# Patient Record
Sex: FEMALE | Race: WHITE | Employment: UNEMPLOYED | URBAN - METROPOLITAN AREA
[De-identification: names, ages, dates, MRNs, and addresses within clinical notes are randomized per-mention and may not be internally consistent; named-entity substitution may affect disease eponyms.]

---

## 2020-12-01 ENCOUNTER — DOCUMENTATION ONLY (OUTPATIENT)
Dept: PULMONOLOGY | Age: 65
End: 2020-12-01

## 2020-12-01 NOTE — PROGRESS NOTES
Called pt to set up new sleep apt ref by Dr Dewayne Mcadams; pt did not want to schedule at this time. Will call back when she is ready to schedule.

## 2024-11-11 ENCOUNTER — APPOINTMENT (OUTPATIENT)
Facility: HOSPITAL | Age: 69
End: 2024-11-11
Payer: MEDICARE

## 2024-11-11 ENCOUNTER — HOSPITAL ENCOUNTER (INPATIENT)
Facility: HOSPITAL | Age: 69
LOS: 4 days | Discharge: NURSING, REHAB OR LONG-TERM CARE | End: 2024-11-15
Attending: STUDENT IN AN ORGANIZED HEALTH CARE EDUCATION/TRAINING PROGRAM | Admitting: INTERNAL MEDICINE
Payer: MEDICARE

## 2024-11-11 DIAGNOSIS — G93.41 METABOLIC ENCEPHALOPATHY: ICD-10-CM

## 2024-11-11 DIAGNOSIS — G43.809 OTHER MIGRAINE WITHOUT STATUS MIGRAINOSUS, NOT INTRACTABLE: ICD-10-CM

## 2024-11-11 DIAGNOSIS — N39.0 URINARY TRACT INFECTION WITHOUT HEMATURIA, SITE UNSPECIFIED: Primary | ICD-10-CM

## 2024-11-11 PROBLEM — G92.8 TOXIC METABOLIC ENCEPHALOPATHY: Status: ACTIVE | Noted: 2024-11-11

## 2024-11-11 LAB
AMORPH CRY URNS QL MICRO: ABNORMAL
ANION GAP SERPL CALC-SCNC: 10 MMOL/L (ref 3–18)
APPEARANCE UR: ABNORMAL
BACTERIA URNS QL MICRO: ABNORMAL /HPF
BASOPHILS # BLD: 0.3 K/UL (ref 0–0.1)
BASOPHILS NFR BLD: 1 % (ref 0–2)
BILIRUB UR QL: NEGATIVE
BUN SERPL-MCNC: 22 MG/DL (ref 7–18)
BUN/CREAT SERPL: 13 (ref 12–20)
CALCIUM SERPL-MCNC: 8.8 MG/DL (ref 8.5–10.1)
CHLORIDE SERPL-SCNC: 104 MMOL/L (ref 100–111)
CO2 SERPL-SCNC: 23 MMOL/L (ref 21–32)
COLOR UR: YELLOW
CREAT SERPL-MCNC: 1.76 MG/DL (ref 0.6–1.3)
DIFFERENTIAL METHOD BLD: ABNORMAL
EOSINOPHIL # BLD: 0 K/UL (ref 0–0.4)
EOSINOPHIL NFR BLD: 0 % (ref 0–5)
EPITH CASTS URNS QL MICRO: ABNORMAL /LPF (ref 0–5)
ERYTHROCYTE [DISTWIDTH] IN BLOOD BY AUTOMATED COUNT: 15.8 % (ref 11.6–14.5)
FLUAV RNA SPEC QL NAA+PROBE: NOT DETECTED
FLUBV RNA SPEC QL NAA+PROBE: NOT DETECTED
GLUCOSE SERPL-MCNC: 131 MG/DL (ref 74–99)
GLUCOSE UR STRIP.AUTO-MCNC: NEGATIVE MG/DL
HCT VFR BLD AUTO: 34.3 % (ref 35–45)
HGB BLD-MCNC: 10.9 G/DL (ref 12–16)
HGB UR QL STRIP: NEGATIVE
IMM GRANULOCYTES # BLD AUTO: 0 K/UL
IMM GRANULOCYTES NFR BLD AUTO: 0 %
KETONES UR QL STRIP.AUTO: NEGATIVE MG/DL
LACTATE BLD-SCNC: 1.52 MMOL/L (ref 0.4–2)
LEUKOCYTE ESTERASE UR QL STRIP.AUTO: ABNORMAL
LYMPHOCYTES # BLD: 1.4 K/UL (ref 0.9–3.6)
LYMPHOCYTES NFR BLD: 5 % (ref 21–52)
MCH RBC QN AUTO: 25.4 PG (ref 24–34)
MCHC RBC AUTO-ENTMCNC: 31.8 G/DL (ref 31–37)
MCV RBC AUTO: 80 FL (ref 78–100)
METAMYELOCYTES NFR BLD MANUAL: 2 %
MONOCYTES # BLD: 1.9 K/UL (ref 0.05–1.2)
MONOCYTES NFR BLD: 7 % (ref 3–10)
MUCOUS THREADS URNS QL MICRO: ABNORMAL /LPF
NEUTS BAND NFR BLD MANUAL: 2 % (ref 0–5)
NEUTS SEG # BLD: 23.3 K/UL (ref 1.8–8)
NEUTS SEG NFR BLD: 83 % (ref 40–73)
NITRITE UR QL STRIP.AUTO: POSITIVE
NRBC # BLD: 0 K/UL (ref 0–0.01)
NRBC BLD-RTO: 0 PER 100 WBC
PH UR STRIP: 7.5 (ref 5–8)
PLATELET # BLD AUTO: 232 K/UL (ref 135–420)
PLATELET COMMENT: ABNORMAL
PMV BLD AUTO: 9.3 FL (ref 9.2–11.8)
POTASSIUM SERPL-SCNC: 3.7 MMOL/L (ref 3.5–5.5)
PROT UR STRIP-MCNC: ABNORMAL MG/DL
RBC # BLD AUTO: 4.29 M/UL (ref 4.2–5.3)
RBC #/AREA URNS HPF: ABNORMAL /HPF (ref 0–5)
RBC MORPH BLD: ABNORMAL
RBC MORPH BLD: ABNORMAL
SARS-COV-2 RNA RESP QL NAA+PROBE: NOT DETECTED
SODIUM SERPL-SCNC: 137 MMOL/L (ref 136–145)
SOURCE: NORMAL
SP GR UR REFRACTOMETRY: 1.01 (ref 1–1.03)
UROBILINOGEN UR QL STRIP.AUTO: 0.2 EU/DL (ref 0.2–1)
WBC # BLD AUTO: 27.4 K/UL (ref 4.6–13.2)
WBC URNS QL MICRO: ABNORMAL /HPF (ref 0–5)

## 2024-11-11 PROCEDURE — 81001 URINALYSIS AUTO W/SCOPE: CPT

## 2024-11-11 PROCEDURE — 96366 THER/PROPH/DIAG IV INF ADDON: CPT

## 2024-11-11 PROCEDURE — 71045 X-RAY EXAM CHEST 1 VIEW: CPT

## 2024-11-11 PROCEDURE — 83605 ASSAY OF LACTIC ACID: CPT

## 2024-11-11 PROCEDURE — 51702 INSERT TEMP BLADDER CATH: CPT

## 2024-11-11 PROCEDURE — 87636 SARSCOV2 & INF A&B AMP PRB: CPT

## 2024-11-11 PROCEDURE — 6360000002 HC RX W HCPCS: Performed by: INTERNAL MEDICINE

## 2024-11-11 PROCEDURE — 87186 SC STD MICRODIL/AGAR DIL: CPT

## 2024-11-11 PROCEDURE — 87088 URINE BACTERIA CULTURE: CPT

## 2024-11-11 PROCEDURE — 6360000002 HC RX W HCPCS: Performed by: STUDENT IN AN ORGANIZED HEALTH CARE EDUCATION/TRAINING PROGRAM

## 2024-11-11 PROCEDURE — 70450 CT HEAD/BRAIN W/O DYE: CPT

## 2024-11-11 PROCEDURE — 85025 COMPLETE CBC W/AUTO DIFF WBC: CPT

## 2024-11-11 PROCEDURE — 96365 THER/PROPH/DIAG IV INF INIT: CPT

## 2024-11-11 PROCEDURE — 99285 EMERGENCY DEPT VISIT HI MDM: CPT

## 2024-11-11 PROCEDURE — 1100000000 HC RM PRIVATE

## 2024-11-11 PROCEDURE — 87086 URINE CULTURE/COLONY COUNT: CPT

## 2024-11-11 PROCEDURE — 96375 TX/PRO/DX INJ NEW DRUG ADDON: CPT

## 2024-11-11 PROCEDURE — 2580000003 HC RX 258: Performed by: STUDENT IN AN ORGANIZED HEALTH CARE EDUCATION/TRAINING PROGRAM

## 2024-11-11 PROCEDURE — 80048 BASIC METABOLIC PNL TOTAL CA: CPT

## 2024-11-11 RX ORDER — CEPHALEXIN 500 MG/1
500 CAPSULE ORAL 2 TIMES DAILY
Qty: 14 CAPSULE | Refills: 0 | Status: SHIPPED | OUTPATIENT
Start: 2024-11-11 | End: 2024-11-15 | Stop reason: HOSPADM

## 2024-11-11 RX ORDER — METOCLOPRAMIDE HYDROCHLORIDE 5 MG/ML
10 INJECTION INTRAMUSCULAR; INTRAVENOUS
Status: COMPLETED | OUTPATIENT
Start: 2024-11-11 | End: 2024-11-11

## 2024-11-11 RX ORDER — 0.9 % SODIUM CHLORIDE 0.9 %
500 INTRAVENOUS SOLUTION INTRAVENOUS ONCE
Status: COMPLETED | OUTPATIENT
Start: 2024-11-11 | End: 2024-11-11

## 2024-11-11 RX ORDER — KETOROLAC TROMETHAMINE 15 MG/ML
15 INJECTION, SOLUTION INTRAMUSCULAR; INTRAVENOUS ONCE
Status: COMPLETED | OUTPATIENT
Start: 2024-11-11 | End: 2024-11-11

## 2024-11-11 RX ORDER — LEVOFLOXACIN 5 MG/ML
750 INJECTION, SOLUTION INTRAVENOUS
Status: DISCONTINUED | OUTPATIENT
Start: 2024-11-11 | End: 2024-11-14

## 2024-11-11 RX ORDER — DEXAMETHASONE SODIUM PHOSPHATE 10 MG/ML
10 INJECTION, SOLUTION INTRAMUSCULAR; INTRAVENOUS
Status: COMPLETED | OUTPATIENT
Start: 2024-11-11 | End: 2024-11-11

## 2024-11-11 RX ORDER — MAGNESIUM SULFATE IN WATER 40 MG/ML
2000 INJECTION, SOLUTION INTRAVENOUS
Status: COMPLETED | OUTPATIENT
Start: 2024-11-11 | End: 2024-11-11

## 2024-11-11 RX ORDER — DIPHENHYDRAMINE HYDROCHLORIDE 50 MG/ML
25 INJECTION INTRAMUSCULAR; INTRAVENOUS
Status: COMPLETED | OUTPATIENT
Start: 2024-11-11 | End: 2024-11-11

## 2024-11-11 RX ORDER — 0.9 % SODIUM CHLORIDE 0.9 %
1000 INTRAVENOUS SOLUTION INTRAVENOUS ONCE
Status: COMPLETED | OUTPATIENT
Start: 2024-11-11 | End: 2024-11-11

## 2024-11-11 RX ADMIN — CEFTRIAXONE 1000 MG: 1 INJECTION, POWDER, FOR SOLUTION INTRAMUSCULAR; INTRAVENOUS at 20:04

## 2024-11-11 RX ADMIN — LEVOFLOXACIN 750 MG: 5 INJECTION, SOLUTION INTRAVENOUS at 23:52

## 2024-11-11 RX ADMIN — DIPHENHYDRAMINE HYDROCHLORIDE 25 MG: 50 INJECTION INTRAMUSCULAR; INTRAVENOUS at 17:06

## 2024-11-11 RX ADMIN — METOCLOPRAMIDE HYDROCHLORIDE 10 MG: 5 INJECTION, SOLUTION INTRAMUSCULAR; INTRAVENOUS at 17:06

## 2024-11-11 RX ADMIN — SODIUM CHLORIDE 500 ML: 9 INJECTION, SOLUTION INTRAVENOUS at 17:38

## 2024-11-11 RX ADMIN — KETOROLAC TROMETHAMINE 15 MG: 15 INJECTION, SOLUTION INTRAMUSCULAR; INTRAVENOUS at 18:54

## 2024-11-11 RX ADMIN — MAGNESIUM SULFATE HEPTAHYDRATE 2000 MG: 40 INJECTION, SOLUTION INTRAVENOUS at 17:12

## 2024-11-11 RX ADMIN — DEXAMETHASONE SODIUM PHOSPHATE 10 MG: 10 INJECTION, SOLUTION INTRAMUSCULAR; INTRAVENOUS at 17:15

## 2024-11-11 RX ADMIN — SODIUM CHLORIDE 1000 ML: 9 INJECTION, SOLUTION INTRAVENOUS at 20:03

## 2024-11-11 ASSESSMENT — PAIN SCALES - GENERAL
PAINLEVEL_OUTOF10: 10
PAINLEVEL_OUTOF10: 8

## 2024-11-11 ASSESSMENT — PAIN DESCRIPTION - DESCRIPTORS: DESCRIPTORS: ACHING;THROBBING

## 2024-11-11 ASSESSMENT — ENCOUNTER SYMPTOMS
EYE DISCHARGE: 0
PHOTOPHOBIA: 1
DIARRHEA: 0
SHORTNESS OF BREATH: 0
VOMITING: 0
EYE REDNESS: 0
ABDOMINAL PAIN: 0
CHEST TIGHTNESS: 0
NAUSEA: 1
EYE ITCHING: 0
EYE PAIN: 0

## 2024-11-11 ASSESSMENT — PAIN - FUNCTIONAL ASSESSMENT: PAIN_FUNCTIONAL_ASSESSMENT: 0-10

## 2024-11-11 ASSESSMENT — PAIN DESCRIPTION - LOCATION: LOCATION: HEAD

## 2024-11-11 NOTE — ED TRIAGE NOTES
Pt reports migraine headache for 4 days;  took aspirin and caffeine on her own;  not helping  +photophobia  some nausea, no vomiting  Denies fever;   at [facility] pt given fioricet without relief; pt reports she feels sleepy and speech slow but that happens with the medication  Pt with hx of MS, has urin cath with leg bag;

## 2024-11-11 NOTE — ED PROVIDER NOTES
EMERGENCY DEPARTMENT HISTORY AND PHYSICAL EXAM      Date: 11/11/2024  Patient Name: Holly Garcia    History of Presenting Illness     Chief Complaint   Patient presents with    Headache       Patient is a 69-year-old female with history of multiple sclerosis presenting to the emergency department for evaluation of migraine headache.  States has been ongoing for the past 4 days.  Patient states it feels like her typical migraine headaches.  Describing left-sided frontal pounding pain associated with sensitivity to light and sound.  Reports nausea but no emesis.  Took aspirin and caffeine on her own without relief.  The facility staff gave her Fioricet but did not get any relief.  Denies any trauma.    Spoke with patient's son for additional history. States that she is more confused than normal and that last time that she presented this way she was diagnosed with a urinary tract infection.           PCP: Cristina Enrique APRN - NP    No current facility-administered medications for this encounter.     Current Outpatient Medications   Medication Sig Dispense Refill    cephALEXin (KEFLEX) 500 MG capsule Take 1 capsule by mouth 2 times daily for 7 days 14 capsule 0    albuterol sulfate HFA (PROVENTIL;VENTOLIN;PROAIR) 108 (90 Base) MCG/ACT inhaler Inhale 1 puff into the lungs      azelastine (ASTELIN) 0.1 % nasal spray       calcium carbonate 1500 (600 Ca) MG TABS tablet calcium carbonate 600 mg calcium (1,500 mg) tablet   TAKE ONE TABLET BY MOUTH TWICE DAILY      Calcium Carbonate Antacid 1000 MG CHEW Take 2,000 mg by mouth daily      vitamin D3 (CHOLECALCIFEROL) 10 MCG (400 UNIT) TABS tablet Vitamin D3 10 mcg (400 unit) tablet (Patient not taking: Reported on 6/21/2023)      melatonin 10 MG CAPS capsule Take 1 capsule by mouth      nystatin-triamcinolone (MYCOLOG II) 311377-4.1 UNIT/GM-% cream nystatin-triamcinolone 100,000 unit/g-0.1 % topical cream      polyethylene glycol (GLYCOLAX) 17 GM/SCOOP powder Take 17 g by

## 2024-11-12 ENCOUNTER — ANESTHESIA EVENT (OUTPATIENT)
Facility: HOSPITAL | Age: 69
End: 2024-11-12
Payer: MEDICARE

## 2024-11-12 ENCOUNTER — APPOINTMENT (OUTPATIENT)
Facility: HOSPITAL | Age: 69
End: 2024-11-12
Payer: MEDICARE

## 2024-11-12 ENCOUNTER — ANESTHESIA (OUTPATIENT)
Facility: HOSPITAL | Age: 69
End: 2024-11-12
Payer: MEDICARE

## 2024-11-12 PROBLEM — N39.0 URINARY TRACT INFECTION ASSOCIATED WITH CATHETERIZATION OF URINARY TRACT, INITIAL ENCOUNTER (HCC): Status: ACTIVE | Noted: 2024-11-12

## 2024-11-12 PROBLEM — A41.9 SEPSIS (HCC): Status: ACTIVE | Noted: 2024-11-12

## 2024-11-12 PROBLEM — N17.9 AKI (ACUTE KIDNEY INJURY) (HCC): Status: ACTIVE | Noted: 2024-11-12

## 2024-11-12 PROBLEM — T83.511A URINARY TRACT INFECTION ASSOCIATED WITH CATHETERIZATION OF URINARY TRACT, INITIAL ENCOUNTER (HCC): Status: ACTIVE | Noted: 2024-11-12

## 2024-11-12 PROCEDURE — 6360000002 HC RX W HCPCS: Performed by: INTERNAL MEDICINE

## 2024-11-12 PROCEDURE — 2580000003 HC RX 258: Performed by: INTERNAL MEDICINE

## 2024-11-12 PROCEDURE — 2000000000 HC ICU R&B

## 2024-11-12 PROCEDURE — 6360000002 HC RX W HCPCS: Performed by: NURSE ANESTHETIST, CERTIFIED REGISTERED

## 2024-11-12 PROCEDURE — 6360000004 HC RX CONTRAST MEDICATION: Performed by: UROLOGY

## 2024-11-12 PROCEDURE — 80048 BASIC METABOLIC PNL TOTAL CA: CPT

## 2024-11-12 PROCEDURE — BT1D1ZZ FLUOROSCOPY OF RIGHT KIDNEY, URETER AND BLADDER USING LOW OSMOLAR CONTRAST: ICD-10-PCS | Performed by: UROLOGY

## 2024-11-12 PROCEDURE — 0T768DZ DILATION OF RIGHT URETER WITH INTRALUMINAL DEVICE, VIA NATURAL OR ARTIFICIAL OPENING ENDOSCOPIC: ICD-10-PCS | Performed by: UROLOGY

## 2024-11-12 PROCEDURE — 2500000003 HC RX 250 WO HCPCS: Performed by: NURSE ANESTHETIST, CERTIFIED REGISTERED

## 2024-11-12 PROCEDURE — 6370000000 HC RX 637 (ALT 250 FOR IP): Performed by: INTERNAL MEDICINE

## 2024-11-12 PROCEDURE — 3700000001 HC ADD 15 MINUTES (ANESTHESIA): Performed by: UROLOGY

## 2024-11-12 PROCEDURE — P9047 ALBUMIN (HUMAN), 25%, 50ML: HCPCS | Performed by: INTERNAL MEDICINE

## 2024-11-12 PROCEDURE — 74420 UROGRAPHY RTRGR +-KUB: CPT

## 2024-11-12 PROCEDURE — 97162 PT EVAL MOD COMPLEX 30 MIN: CPT

## 2024-11-12 PROCEDURE — 74176 CT ABD & PELVIS W/O CONTRAST: CPT

## 2024-11-12 PROCEDURE — 84100 ASSAY OF PHOSPHORUS: CPT

## 2024-11-12 PROCEDURE — 82330 ASSAY OF CALCIUM: CPT

## 2024-11-12 PROCEDURE — C1758 CATHETER, URETERAL: HCPCS | Performed by: UROLOGY

## 2024-11-12 PROCEDURE — 3700000000 HC ANESTHESIA ATTENDED CARE: Performed by: UROLOGY

## 2024-11-12 PROCEDURE — C1769 GUIDE WIRE: HCPCS | Performed by: UROLOGY

## 2024-11-12 PROCEDURE — 3600000002 HC SURGERY LEVEL 2 BASE: Performed by: UROLOGY

## 2024-11-12 PROCEDURE — 97530 THERAPEUTIC ACTIVITIES: CPT

## 2024-11-12 PROCEDURE — 2709999900 HC NON-CHARGEABLE SUPPLY: Performed by: UROLOGY

## 2024-11-12 PROCEDURE — 3600000012 HC SURGERY LEVEL 2 ADDTL 15MIN: Performed by: UROLOGY

## 2024-11-12 PROCEDURE — 7100000000 HC PACU RECOVERY - FIRST 15 MIN: Performed by: UROLOGY

## 2024-11-12 PROCEDURE — 7100000001 HC PACU RECOVERY - ADDTL 15 MIN: Performed by: UROLOGY

## 2024-11-12 PROCEDURE — C2617 STENT, NON-COR, TEM W/O DEL: HCPCS | Performed by: UROLOGY

## 2024-11-12 DEVICE — URETERAL STENT
Type: IMPLANTABLE DEVICE | Status: FUNCTIONAL
Brand: POLARIS™ ULTRA

## 2024-11-12 RX ORDER — POLYETHYLENE GLYCOL 3350 17 G/17G
17 POWDER, FOR SOLUTION ORAL DAILY PRN
Status: DISCONTINUED | OUTPATIENT
Start: 2024-11-12 | End: 2024-11-12

## 2024-11-12 RX ORDER — ALBUTEROL SULFATE 0.83 MG/ML
2.5 SOLUTION RESPIRATORY (INHALATION) EVERY 4 HOURS PRN
Status: DISCONTINUED | OUTPATIENT
Start: 2024-11-12 | End: 2024-11-15 | Stop reason: HOSPADM

## 2024-11-12 RX ORDER — IBUPROFEN 200 MG
1250 CAPSULE ORAL 2 TIMES DAILY
Status: DISCONTINUED | OUTPATIENT
Start: 2024-11-12 | End: 2024-11-12 | Stop reason: SDUPTHER

## 2024-11-12 RX ORDER — SODIUM CHLORIDE 0.9 % (FLUSH) 0.9 %
5-40 SYRINGE (ML) INJECTION PRN
Status: DISCONTINUED | OUTPATIENT
Start: 2024-11-12 | End: 2024-11-12 | Stop reason: HOSPADM

## 2024-11-12 RX ORDER — ONDANSETRON 2 MG/ML
INJECTION INTRAMUSCULAR; INTRAVENOUS
Status: DISCONTINUED | OUTPATIENT
Start: 2024-11-12 | End: 2024-11-12 | Stop reason: SDUPTHER

## 2024-11-12 RX ORDER — ONDANSETRON 4 MG/1
4 TABLET, ORALLY DISINTEGRATING ORAL EVERY 8 HOURS PRN
Status: DISCONTINUED | OUTPATIENT
Start: 2024-11-12 | End: 2024-11-15 | Stop reason: HOSPADM

## 2024-11-12 RX ORDER — DEXAMETHASONE SODIUM PHOSPHATE 4 MG/ML
INJECTION, SOLUTION INTRA-ARTICULAR; INTRALESIONAL; INTRAMUSCULAR; INTRAVENOUS; SOFT TISSUE
Status: DISCONTINUED | OUTPATIENT
Start: 2024-11-12 | End: 2024-11-12 | Stop reason: SDUPTHER

## 2024-11-12 RX ORDER — CALCIUM CARBONATE 500(1250)
1 TABLET ORAL 2 TIMES DAILY
Status: DISCONTINUED | OUTPATIENT
Start: 2024-11-12 | End: 2024-11-15 | Stop reason: HOSPADM

## 2024-11-12 RX ORDER — MECOBALAMIN 5000 MCG
10 TABLET,DISINTEGRATING ORAL NIGHTLY PRN
Status: DISCONTINUED | OUTPATIENT
Start: 2024-11-12 | End: 2024-11-15 | Stop reason: HOSPADM

## 2024-11-12 RX ORDER — DULOXETIN HYDROCHLORIDE 30 MG/1
60 CAPSULE, DELAYED RELEASE ORAL DAILY
Status: DISCONTINUED | OUTPATIENT
Start: 2024-11-12 | End: 2024-11-15 | Stop reason: HOSPADM

## 2024-11-12 RX ORDER — IPRATROPIUM BROMIDE AND ALBUTEROL SULFATE 2.5; .5 MG/3ML; MG/3ML
1 SOLUTION RESPIRATORY (INHALATION)
Status: DISCONTINUED | OUTPATIENT
Start: 2024-11-12 | End: 2024-11-12 | Stop reason: HOSPADM

## 2024-11-12 RX ORDER — GABAPENTIN 300 MG/1
300 CAPSULE ORAL 3 TIMES DAILY
Status: DISCONTINUED | OUTPATIENT
Start: 2024-11-12 | End: 2024-11-15 | Stop reason: HOSPADM

## 2024-11-12 RX ORDER — ACETAMINOPHEN 325 MG/1
650 TABLET ORAL DAILY PRN
Status: DISCONTINUED | OUTPATIENT
Start: 2024-11-12 | End: 2024-11-15 | Stop reason: HOSPADM

## 2024-11-12 RX ORDER — IOPAMIDOL 612 MG/ML
INJECTION, SOLUTION INTRATHECAL PRN
Status: DISCONTINUED | OUTPATIENT
Start: 2024-11-12 | End: 2024-11-12 | Stop reason: ALTCHOICE

## 2024-11-12 RX ORDER — PROPOFOL 10 MG/ML
INJECTION, EMULSION INTRAVENOUS
Status: DISCONTINUED | OUTPATIENT
Start: 2024-11-12 | End: 2024-11-12 | Stop reason: SDUPTHER

## 2024-11-12 RX ORDER — CLOPIDOGREL BISULFATE 75 MG/1
75 TABLET ORAL ONCE
Status: DISCONTINUED | OUTPATIENT
Start: 2024-11-12 | End: 2024-11-15 | Stop reason: HOSPADM

## 2024-11-12 RX ORDER — LABETALOL HYDROCHLORIDE 5 MG/ML
10 INJECTION, SOLUTION INTRAVENOUS
Status: DISCONTINUED | OUTPATIENT
Start: 2024-11-12 | End: 2024-11-12 | Stop reason: HOSPADM

## 2024-11-12 RX ORDER — DROPERIDOL 2.5 MG/ML
0.62 INJECTION, SOLUTION INTRAMUSCULAR; INTRAVENOUS
Status: DISCONTINUED | OUTPATIENT
Start: 2024-11-12 | End: 2024-11-12 | Stop reason: HOSPADM

## 2024-11-12 RX ORDER — SODIUM CHLORIDE 0.9 % (FLUSH) 0.9 %
5-40 SYRINGE (ML) INJECTION EVERY 12 HOURS SCHEDULED
Status: DISCONTINUED | OUTPATIENT
Start: 2024-11-12 | End: 2024-11-12 | Stop reason: HOSPADM

## 2024-11-12 RX ORDER — NALOXONE HYDROCHLORIDE 0.4 MG/ML
INJECTION, SOLUTION INTRAMUSCULAR; INTRAVENOUS; SUBCUTANEOUS PRN
Status: DISCONTINUED | OUTPATIENT
Start: 2024-11-12 | End: 2024-11-12 | Stop reason: HOSPADM

## 2024-11-12 RX ORDER — MESALAMINE 400 MG/1
800 CAPSULE, DELAYED RELEASE ORAL 3 TIMES DAILY
Status: DISCONTINUED | OUTPATIENT
Start: 2024-11-12 | End: 2024-11-15 | Stop reason: HOSPADM

## 2024-11-12 RX ORDER — BACLOFEN 10 MG/1
10 TABLET ORAL 3 TIMES DAILY
Status: DISCONTINUED | OUTPATIENT
Start: 2024-11-12 | End: 2024-11-15 | Stop reason: HOSPADM

## 2024-11-12 RX ORDER — CARBIDOPA AND LEVODOPA 25; 100 MG/1; MG/1
1 TABLET ORAL 2 TIMES DAILY
Status: DISCONTINUED | OUTPATIENT
Start: 2024-11-12 | End: 2024-11-15 | Stop reason: HOSPADM

## 2024-11-12 RX ORDER — LIDOCAINE HYDROCHLORIDE 20 MG/ML
INJECTION, SOLUTION EPIDURAL; INFILTRATION; INTRACAUDAL; PERINEURAL
Status: DISCONTINUED | OUTPATIENT
Start: 2024-11-12 | End: 2024-11-12 | Stop reason: SDUPTHER

## 2024-11-12 RX ORDER — SODIUM CHLORIDE 0.9 % (FLUSH) 0.9 %
5-40 SYRINGE (ML) INJECTION PRN
Status: DISCONTINUED | OUTPATIENT
Start: 2024-11-12 | End: 2024-11-15 | Stop reason: HOSPADM

## 2024-11-12 RX ORDER — ENOXAPARIN SODIUM 100 MG/ML
40 INJECTION SUBCUTANEOUS DAILY
Status: DISCONTINUED | OUTPATIENT
Start: 2024-11-12 | End: 2024-11-12

## 2024-11-12 RX ORDER — TRAZODONE HYDROCHLORIDE 50 MG/1
50 TABLET, FILM COATED ORAL NIGHTLY
Status: DISCONTINUED | OUTPATIENT
Start: 2024-11-12 | End: 2024-11-15 | Stop reason: HOSPADM

## 2024-11-12 RX ORDER — FLUTICASONE PROPIONATE 50 MCG
1 SPRAY, SUSPENSION (ML) NASAL 2 TIMES DAILY PRN
Status: DISCONTINUED | OUTPATIENT
Start: 2024-11-12 | End: 2024-11-12

## 2024-11-12 RX ORDER — SODIUM CHLORIDE 9 MG/ML
INJECTION, SOLUTION INTRAVENOUS PRN
Status: DISCONTINUED | OUTPATIENT
Start: 2024-11-12 | End: 2024-11-15 | Stop reason: HOSPADM

## 2024-11-12 RX ORDER — FUROSEMIDE 20 MG/1
10 TABLET ORAL DAILY
Status: DISCONTINUED | OUTPATIENT
Start: 2024-11-14 | End: 2024-11-15 | Stop reason: HOSPADM

## 2024-11-12 RX ORDER — FENTANYL CITRATE 50 UG/ML
INJECTION, SOLUTION INTRAMUSCULAR; INTRAVENOUS
Status: DISCONTINUED | OUTPATIENT
Start: 2024-11-12 | End: 2024-11-12 | Stop reason: SDUPTHER

## 2024-11-12 RX ORDER — ONDANSETRON 2 MG/ML
4 INJECTION INTRAMUSCULAR; INTRAVENOUS
Status: DISCONTINUED | OUTPATIENT
Start: 2024-11-12 | End: 2024-11-12 | Stop reason: HOSPADM

## 2024-11-12 RX ORDER — ALBUMIN (HUMAN) 12.5 G/50ML
25 SOLUTION INTRAVENOUS ONCE
Status: COMPLETED | OUTPATIENT
Start: 2024-11-12 | End: 2024-11-12

## 2024-11-12 RX ORDER — AZELASTINE 1 MG/ML
1 SPRAY, METERED NASAL 2 TIMES DAILY
Status: ACTIVE | OUTPATIENT
Start: 2024-11-12 | End: 2024-11-15

## 2024-11-12 RX ORDER — ONDANSETRON 2 MG/ML
4 INJECTION INTRAMUSCULAR; INTRAVENOUS EVERY 6 HOURS PRN
Status: DISCONTINUED | OUTPATIENT
Start: 2024-11-12 | End: 2024-11-15 | Stop reason: HOSPADM

## 2024-11-12 RX ORDER — DIPHENHYDRAMINE HYDROCHLORIDE 50 MG/ML
12.5 INJECTION INTRAMUSCULAR; INTRAVENOUS
Status: DISCONTINUED | OUTPATIENT
Start: 2024-11-12 | End: 2024-11-12 | Stop reason: HOSPADM

## 2024-11-12 RX ORDER — SODIUM CHLORIDE 9 MG/ML
INJECTION, SOLUTION INTRAVENOUS PRN
Status: DISCONTINUED | OUTPATIENT
Start: 2024-11-12 | End: 2024-11-12 | Stop reason: HOSPADM

## 2024-11-12 RX ORDER — ROPINIROLE 0.25 MG/1
0.25 TABLET, FILM COATED ORAL
Status: DISCONTINUED | OUTPATIENT
Start: 2024-11-12 | End: 2024-11-15 | Stop reason: HOSPADM

## 2024-11-12 RX ORDER — BUTALBITAL, ACETAMINOPHEN AND CAFFEINE 50; 325; 40 MG/1; MG/1; MG/1
1 TABLET ORAL EVERY 6 HOURS PRN
Status: ON HOLD | COMMUNITY
End: 2024-11-12 | Stop reason: SINTOL

## 2024-11-12 RX ORDER — ATORVASTATIN CALCIUM 20 MG/1
20 TABLET, FILM COATED ORAL NIGHTLY
Status: DISCONTINUED | OUTPATIENT
Start: 2024-11-12 | End: 2024-11-15 | Stop reason: HOSPADM

## 2024-11-12 RX ORDER — HEPARIN SODIUM 5000 [USP'U]/ML
5000 INJECTION, SOLUTION INTRAVENOUS; SUBCUTANEOUS EVERY 8 HOURS SCHEDULED
Status: DISCONTINUED | OUTPATIENT
Start: 2024-11-13 | End: 2024-11-15 | Stop reason: HOSPADM

## 2024-11-12 RX ORDER — SODIUM CHLORIDE 0.9 % (FLUSH) 0.9 %
5-40 SYRINGE (ML) INJECTION EVERY 12 HOURS SCHEDULED
Status: DISCONTINUED | OUTPATIENT
Start: 2024-11-12 | End: 2024-11-15 | Stop reason: HOSPADM

## 2024-11-12 RX ORDER — FUROSEMIDE 20 MG/1
10 TABLET ORAL DAILY
Status: ON HOLD | COMMUNITY
End: 2024-11-15 | Stop reason: HOSPADM

## 2024-11-12 RX ORDER — DOCUSATE SODIUM 100 MG/1
100 CAPSULE, LIQUID FILLED ORAL 2 TIMES DAILY
Status: DISCONTINUED | OUTPATIENT
Start: 2024-11-12 | End: 2024-11-15 | Stop reason: HOSPADM

## 2024-11-12 RX ORDER — HYDROMORPHONE HYDROCHLORIDE 1 MG/ML
0.5 INJECTION, SOLUTION INTRAMUSCULAR; INTRAVENOUS; SUBCUTANEOUS EVERY 5 MIN PRN
Status: DISCONTINUED | OUTPATIENT
Start: 2024-11-12 | End: 2024-11-12 | Stop reason: HOSPADM

## 2024-11-12 RX ORDER — ALBUTEROL SULFATE 90 UG/1
1 INHALANT RESPIRATORY (INHALATION) EVERY 4 HOURS PRN
Status: DISCONTINUED | OUTPATIENT
Start: 2024-11-12 | End: 2024-11-12 | Stop reason: ALTCHOICE

## 2024-11-12 RX ORDER — 0.9 % SODIUM CHLORIDE 0.9 %
500 INTRAVENOUS SOLUTION INTRAVENOUS ONCE
Status: COMPLETED | OUTPATIENT
Start: 2024-11-12 | End: 2024-11-12

## 2024-11-12 RX ORDER — SODIUM CHLORIDE, SODIUM LACTATE, POTASSIUM CHLORIDE, CALCIUM CHLORIDE 600; 310; 30; 20 MG/100ML; MG/100ML; MG/100ML; MG/100ML
INJECTION, SOLUTION INTRAVENOUS CONTINUOUS
Status: DISCONTINUED | OUTPATIENT
Start: 2024-11-12 | End: 2024-11-12 | Stop reason: HOSPADM

## 2024-11-12 RX ORDER — SODIUM CHLORIDE 9 MG/ML
INJECTION, SOLUTION INTRAVENOUS CONTINUOUS
Status: DISPENSED | OUTPATIENT
Start: 2024-11-12 | End: 2024-11-13

## 2024-11-12 RX ORDER — PHENYLEPHRINE HCL IN 0.9% NACL 1 MG/10 ML
SYRINGE (ML) INTRAVENOUS
Status: DISCONTINUED | OUTPATIENT
Start: 2024-11-12 | End: 2024-11-12 | Stop reason: SDUPTHER

## 2024-11-12 RX ORDER — FENTANYL CITRATE 50 UG/ML
25 INJECTION, SOLUTION INTRAMUSCULAR; INTRAVENOUS EVERY 5 MIN PRN
Status: DISCONTINUED | OUTPATIENT
Start: 2024-11-12 | End: 2024-11-12 | Stop reason: HOSPADM

## 2024-11-12 RX ORDER — FLUTICASONE PROPIONATE 50 MCG
1 SPRAY, SUSPENSION (ML) NASAL 2 TIMES DAILY
Status: DISCONTINUED | OUTPATIENT
Start: 2024-11-13 | End: 2024-11-15 | Stop reason: HOSPADM

## 2024-11-12 RX ADMIN — ENOXAPARIN SODIUM 40 MG: 100 INJECTION SUBCUTANEOUS at 10:22

## 2024-11-12 RX ADMIN — ALBUMIN (HUMAN) 25 G: 0.25 INJECTION, SOLUTION INTRAVENOUS at 20:37

## 2024-11-12 RX ADMIN — Medication 100 MCG: at 21:35

## 2024-11-12 RX ADMIN — SODIUM CHLORIDE, PRESERVATIVE FREE 10 ML: 5 INJECTION INTRAVENOUS at 10:26

## 2024-11-12 RX ADMIN — LIDOCAINE HYDROCHLORIDE 100 MG: 20 INJECTION, SOLUTION EPIDURAL; INFILTRATION; INTRACAUDAL; PERINEURAL at 21:25

## 2024-11-12 RX ADMIN — SODIUM CHLORIDE: 900 INJECTION, SOLUTION INTRAVENOUS at 21:45

## 2024-11-12 RX ADMIN — FENTANYL CITRATE 50 MCG: 50 INJECTION, SOLUTION INTRAMUSCULAR; INTRAVENOUS at 21:38

## 2024-11-12 RX ADMIN — DULOXETINE HYDROCHLORIDE 60 MG: 30 CAPSULE, DELAYED RELEASE ORAL at 10:17

## 2024-11-12 RX ADMIN — FENTANYL CITRATE 50 MCG: 50 INJECTION, SOLUTION INTRAMUSCULAR; INTRAVENOUS at 21:21

## 2024-11-12 RX ADMIN — SODIUM CHLORIDE, PRESERVATIVE FREE 10 ML: 5 INJECTION INTRAVENOUS at 22:54

## 2024-11-12 RX ADMIN — PIPERACILLIN AND TAZOBACTAM 3375 MG: 3; .375 INJECTION, POWDER, LYOPHILIZED, FOR SOLUTION INTRAVENOUS at 23:57

## 2024-11-12 RX ADMIN — DEXAMETHASONE SODIUM PHOSPHATE 4 MG: 4 INJECTION INTRA-ARTICULAR; INTRALESIONAL; INTRAMUSCULAR; INTRAVENOUS; SOFT TISSUE at 21:37

## 2024-11-12 RX ADMIN — Medication 100 MCG: at 21:46

## 2024-11-12 RX ADMIN — SODIUM CHLORIDE: 900 INJECTION, SOLUTION INTRAVENOUS at 13:14

## 2024-11-12 RX ADMIN — CARBIDOPA AND LEVODOPA 1 TABLET: 25; 100 TABLET ORAL at 10:16

## 2024-11-12 RX ADMIN — Medication 100 MCG: at 21:32

## 2024-11-12 RX ADMIN — ONDANSETRON 4 MG: 2 INJECTION INTRAMUSCULAR; INTRAVENOUS at 21:29

## 2024-11-12 RX ADMIN — PIPERACILLIN AND TAZOBACTAM 3375 MG: 3; .375 INJECTION, POWDER, LYOPHILIZED, FOR SOLUTION INTRAVENOUS at 13:14

## 2024-11-12 RX ADMIN — SODIUM CHLORIDE: 900 INJECTION, SOLUTION INTRAVENOUS at 22:12

## 2024-11-12 RX ADMIN — SODIUM CHLORIDE 500 ML: 9 INJECTION, SOLUTION INTRAVENOUS at 17:56

## 2024-11-12 RX ADMIN — PROPOFOL 150 MG: 10 INJECTION, EMULSION INTRAVENOUS at 21:25

## 2024-11-12 RX ADMIN — PIPERACILLIN AND TAZOBACTAM 3375 MG: 3; .375 INJECTION, POWDER, LYOPHILIZED, FOR SOLUTION INTRAVENOUS at 18:42

## 2024-11-12 RX ADMIN — Medication 100 MCG: at 21:38

## 2024-11-12 RX ADMIN — Medication 100 MCG: at 21:42

## 2024-11-12 ASSESSMENT — PAIN SCALES - GENERAL
PAINLEVEL_OUTOF10: 0
PAINLEVEL_OUTOF10: 0

## 2024-11-12 ASSESSMENT — PAIN SCALES - WONG BAKER: WONGBAKER_NUMERICALRESPONSE: NO HURT

## 2024-11-12 NOTE — PROGRESS NOTES
Physical Therapy Goals:  Initiated 11/12/2024 to be met within 7-10 days.  Short Term Goals  Short Term Goal 1: Patient will perform rolling R/L with independence  Short Term Goal 2: Patient will perform supine to/from sit with supervision  Short Term Goal 3: Patient will perform sit to/from stand with Anuj in prep for gait training  Short Term Goal 4: Patient will perform bed to/from chair transfer with Anuj to progress OOB mobility  PHYSICAL THERAPY EVALUATION    Patient: Holly Garcia (69 y.o. female)  Date: 11/12/2024  Diagnosis: Metabolic encephalopathy [G93.41]  UTI (urinary tract infection) [N39.0]  Other migraine without status migrainosus, not intractable [G43.809]  Urinary tract infection without hematuria, site unspecified [N39.0]  Toxic metabolic encephalopathy [G92.8]  Urinary tract infection associated with catheterization of urinary tract, initial encounter ([facility]) [T83.511A, N39.0] UTI (urinary tract infection)  Precautions: Fall Risk  PLOF: Assisted lateral transfers to/from wheelchair. Patient reports she does not ambulate. Lives in long-term.    ASSESSMENT :  Patient received supine in bed. Pt presents with decreased LE strength, decreased endurance, decreased activity tolerance, decreased bed mobility and transfers , impaired cognition, decreased command following, decreased trunk control, and impaired balance. Patient with a history of MS. Patient performed supine to sit with Anuj. Trunk control decreased requiring intermittent CGA to maintain upright sitting position. Patient with frequent extraneous movement, appears anxious. Difficult to understand at times due to word finding difficulties and expressive aphasia. Requires reorientation to task. Patient appears below baseline level and would benefit from continued skilled PT to progress functional mobility.     DEFICITS/IMPAIRMENTS:   Body Structures, Functions, Activity Limitations Requiring Skilled Therapeutic Intervention: Decreased functional  (retirement)  Type of Home: Assisted living  Home Layout: One level  Home Access: Level entry  Home Equipment: Wheelchair - Manual  ADL Assistance: Needs assistance  Bath: Maximum assistance  Dressing: Moderate assistance  Grooming: Moderate assistance  Feeding: Minimal assistance  Toileting: Needs assistance  Homemaking Assistance: Needs assistance  Meal Prep: Maximal  Laundry: Total  Vacuuming: Total  Cleaning: Total  Gardening: Total  Yard Work: Total  Driving: Total  Shopping: Total  Homemaking Responsibilities: No  Ambulation Assistance: Needs assistance (Pt uses wheelchair)  Transfer Assistance: Needs assistance  Occupation: Retired  Type of Occupation: Retired  Strength:    Strength: Generally decreased, functional  Tone & Sensation:   Tone: Normal  Sensation: Impaired  Range Of Motion:  AROM: Generally decreased, functional  PROM: Generally decreased, functional  Functional Mobility:  Bed Mobility:   Bed Mobility Training  Bed Mobility Training: Yes  Rolling: Minimum assistance  Supine to Sit: Minimum assistance  Sit to Supine: Minimum assistance  Balance:   Balance  Sitting: Impaired  Sitting - Static: Fair (occasional)  Sitting - Dynamic: Poor (constant support)  Pain:  Pain level pre-treatment: 10/10   Pain level post-treatment: 10/10   Pain Intervention(s): Medication (see MAR); Rest, Ice, Repositioning  Response to intervention: Nurse notified, See doc flow    Activity Tolerance:   Activity Tolerance: Patient tolerated evaluation without incident;Patient limited by endurance (anxiety)    After treatment:   []         Patient left in no apparent distress sitting up in chair  [x]         Patient left in no apparent distress in bed  [x]         Call bell left within reach  [x]         Nursing notified  []         Caregiver present  [x]         Bed alarm activated  []         SCDs applied    COMMUNICATION/EDUCATION:   Patient Education  Education Given To: Patient  Education Provided: Role of Therapy;Plan of

## 2024-11-12 NOTE — PROGRESS NOTES
TRANSFER - IN REPORT:    Verbal report received from Melita. RN on Holly Garcia  being received from ED for routine progression of patient care      Report consisted of patient's Situation, Background, Assessment and   Recommendations(SBAR).     Information from the following report(s) Nurse Handoff Report, Index, ED Encounter Summary, ED SBAR, Adult Overview, Intake/Output, MAR, and Recent Results was reviewed with the receiving nurse.    Opportunity for questions and clarification was provided.        2300  Care assumed upon patient's arrival to unit. Assessment done and documented in flowsheet, patient a & o x 1, restless and confused, rapp in place with leg bag, changed to a standard urine bag. Unable to complete some admission questionnaire due to confusion, minimal blood seen on gown and sheets, assessed to be from displaced IV line, no active bleeding noted, cleaned and changed, safety measures in place.

## 2024-11-12 NOTE — PLAN OF CARE
Problem: Pain  Goal: Verbalizes/displays adequate comfort level or baseline comfort level  Outcome: Progressing     Problem: Safety - Adult  Goal: Free from fall injury  Outcome: Progressing     Problem: Confusion  Goal: Confusion, delirium, dementia, or psychosis is improved or at baseline  Description: INTERVENTIONS:  1. Assess for possible contributors to thought disturbance, including medications, impaired vision or hearing, underlying metabolic abnormalities, dehydration, psychiatric diagnoses, and notify attending LIP  2. [place] high risk fall precautions, as indicated  3. Provide frequent short contacts to provide reality reorientation, refocusing and direction  4. Decrease environmental stimuli, including noise as appropriate  5. Monitor and intervene to maintain adequate nutrition, hydration, elimination, sleep and activity  6. If unable to ensure safety without constant attention obtain sitter and review sitter guidelines with assigned personnel  7. Initiate Psychosocial CNS and Spiritual Care consult, as indicated  Outcome: Progressing      Addended by: RONNIE STRICKLAND on: 3/5/2021 03:49 PM     Modules accepted: Orders

## 2024-11-12 NOTE — H&P
Coronal and sagittal reformats. CT dose reduction was achieved through use of a standardized protocol tailored for this examination and automatic exposure control for dose modulation.  FINDINGS: The ventricles and sulci are normal in size, shape and configuration. Significant small vessel ischemic changes are seen in the periventricular white matter. There is no intracranial hemorrhage, extra-axial collection, or mass effect. The basilar cisterns are open. No CT evidence of acute infarct. The bone windows demonstrate no abnormalities. The visualized portions of the paranasal sinuses and mastoid air cells are clear.     No acute abnormality. Electronically signed by CRISTÓBAL NOBLE    Imaging results impression onlyXR CHEST PORTABLE    Result Date: 11/11/2024  No acute intrathoracic process. Electronically signed by [employer]    CT HEAD WO CONTRAST    Result Date: 11/11/2024  No acute abnormality. Electronically signed by CRISTÓBAL NOBLE     XR CHEST PORTABLE   Final Result   No acute intrathoracic process.         Electronically signed by [employer]      CT HEAD WO CONTRAST   Final Result   No acute abnormality.            Electronically signed by CRISTÓBAL NOBLE        LAST EKG  No results found for this or any previous visit.    Procedures & Imaging: see electronic medical records for all procedures, imaging (including ultrasounds) and microbiology data as well as reviewing past records, which may have not been copied into this note, but were reviewed prior to development of assessment and plan.       Assessment/Plan     Principal Problem:    UTI (urinary tract infection)  Active Problems:    Neurogenic bladder    Restless leg    Toxic metabolic encephalopathy    Urinary tract infection associated with catheterization of urinary tract, initial encounter ([facility])  Resolved Problems:    * No resolved hospital problems. *       Admit to med surg w/ remote tele:    UTI, complicated/cath-associated  ED: started rocephin and  patient/family/caregiver  Care coordination and discharge planning with Case Management.        Dear patient, if you are reviewing this note and have a question regarding the medical terminology, please bring it with you to your next PCP visit.  Medical notes are meant to be a communication between medical professionals.  Additionally, portion of this note were created using voice recognition function; therefore, syntax and phonetic errors may have escaped proofreading.    Niall Dutta DO, PhD  [facility]    11/12/2024 9:01 AM

## 2024-11-12 NOTE — PROGRESS NOTES
reviewing separately obtained history  Performing a medically necessary appropriate examination and/or evaluation  Counseling and educating the patient/family/caregiver  Ordering medications, tests, or procedures  Referring and communicating with other health care professionals as needed  Documenting clinical information in the electronic or other health record  Independently interpreting results (not reported separately) and communicating results to the patient/family/caregiver  Care coordination and discharge planning with Case Management.      Dear patient, if you are reviewing this note and have a question regarding the medical terminology, please bring it with you to your next PCP visit.  Medical notes are meant to be a communication between medical professionals.  Additionally, portion of this note were created using voice recognition function, syntax and phonetic over may have escaped proofreading.    Shmuel Joel MD

## 2024-11-12 NOTE — CARE COORDINATION
This writer spoke with the pt at the bedside. The pt was admitted for a CAUTI. The pt requires a wheelchair for mobility and requires maximal assistance with ADL's due to MS diagnosis. The pt resides in [facility]. This writer contacted the facility who confirmed the pt is a resident of the Assisted Living portion of their facility. The pt will discharge back to [facility] when medically ready. Anticipated DC needs include medical transport, which this writer will set up when the pt is medically ready for discharge. No other needs identified. PCP confirmed. All questions answered.     11/12/24 2196   Service Assessment   Patient Orientation Alert and Oriented   Cognition Alert   History Provided By Patient   Primary Caregiver Private caregiver  (jail staff)   Support Systems Children;Other (Comment);Family Members  (jail staff)   Patient's Healthcare Decision Maker is: Legal Next of Kin   PCP Verified by CM Yes   Last Visit to PCP Within last 6 months   Prior Functional Level Assistance with the following:;Bathing;Dressing;Toileting;Mobility   Current Functional Level Assistance with the following:;Bathing;Dressing;Toileting;Mobility   Can patient return to prior living arrangement Yes   Ability to make needs known: Fair   Family able to assist with home care needs: Other (comment)  ([facility])   Would you like for me to discuss the discharge plan with any other family members/significant others, and if so, who? Yes  (This writer will call pt's son)   Financial Resources Medicare   Community Resources Assisted Living   CM/SW Referral Other (see comment)   Social/Functional History   Lives With Other (comment)  (jail staff)   Type of Home Assisted living   Home Equipment Wheelchair - Manual   ADL Assistance Needs assistance   Bath Maximum assistance   Dressing Moderate assistance   Grooming Moderate assistance   Feeding Minimal assistance   Toileting Needs assistance   Homemaking Assistance Needs

## 2024-11-12 NOTE — ED NOTES
Report called to floor RN Beatrice, no further questions/orders at this time. Will set up bed watch and have patient taken up since she does not have telemetry orders.

## 2024-11-12 NOTE — PROGRESS NOTES
4 Eyes Skin Assessment     NAME:  Holly Garcia  YOB: 1955  MEDICAL RECORD NUMBER:  [id number]    The patient is being assessed for  Admission    I agree that at least one RN has performed a thorough Head to Toe Skin Assessment on the patient. ALL assessment sites listed below have been assessed.      Areas assessed by both nurses:    Head, Face, Ears, Shoulders, Back, Chest, Arms, Elbows, Hands, Sacrum. Buttock, Coccyx, Ischium, and Legs. Feet and Heels        Does the Patient have a Wound? Yes wound(s) were present on assessment. LDA wound assessment was Initiated and completed by RN. Wound on right posterior ankle about a debi size with tape dressing.       Luke Prevention initiated by RN: Yes  Wound Care Orders initiated by RN: No    Pressure Injury (Stage 3,4, Unstageable, DTI, NWPT, and Complex wounds) if present, place Wound referral order by RN under : No    New Ostomies, if present place, Ostomy referral order under : No     Nurse 1 eSignature: Electronically signed by Halima Andino RN on 11/12/24 at 12:31 AM EST    **SHARE this note so that the co-signing nurse can place an eSignature**    Nurse 2 eSignature: Electronically signed by Talha Espinoza RN on 11/12/24 at 4:32 AM EST

## 2024-11-12 NOTE — PROGRESS NOTES
Bedside and Verbal shift change report given to Porter RN (oncoming nurse). Report included the following information Nurse Handoff Report, Index, ED Encounter Summary, ED SBAR, Adult Overview, Intake/Output, MAR, and Recent Results.

## 2024-11-12 NOTE — PROGRESS NOTES
Occupational Therapy Evaluation Attempt    Chart reviewed. Attempted Occupational Therapy Evaluation however, patient unable to be seen due to:  []  Nausea/vomiting  []  Eating  []  Pain  []  Patient too lethargic  []  Off Unit for testing/procedure  []  Dialysis treatment in progress   []  Telemetry Results  [x]  Other: Patient fast asleep upon OT entry with knock and verbal stimuli. Did not arouse. OT opting to let patient rest at this time. Will follow back as schedule allows.       Thank you for this referral.  Lucero Doyle, ANURADHA, OTR/L

## 2024-11-13 PROBLEM — N13.9 UROPATHY, OBSTRUCTIVE: Status: ACTIVE | Noted: 2024-11-13

## 2024-11-13 PROBLEM — I95.9 HYPOTENSION: Status: ACTIVE | Noted: 2024-11-13

## 2024-11-13 LAB
ALBUMIN SERPL-MCNC: 2.6 G/DL (ref 3.4–5)
ALBUMIN/GLOB SERPL: 0.8 (ref 0.8–1.7)
ALP SERPL-CCNC: 148 U/L (ref 45–117)
ALT SERPL-CCNC: 16 U/L (ref 13–56)
ANION GAP SERPL CALC-SCNC: 9 MMOL/L (ref 3–18)
ANION GAP SERPL CALC-SCNC: 9 MMOL/L (ref 3–18)
AST SERPL-CCNC: 28 U/L (ref 10–38)
BILIRUB SERPL-MCNC: 0.5 MG/DL (ref 0.2–1)
BUN SERPL-MCNC: 24 MG/DL (ref 7–18)
BUN SERPL-MCNC: 27 MG/DL (ref 7–18)
BUN/CREAT SERPL: 16 (ref 12–20)
BUN/CREAT SERPL: 16 (ref 12–20)
CA-I SERPL-SCNC: 0.99 MMOL/L (ref 1.12–1.32)
CA-I SERPL-SCNC: 1.04 MMOL/L (ref 1.12–1.32)
CA-I SERPL-SCNC: 1.16 MMOL/L (ref 1.12–1.32)
CALCIUM SERPL-MCNC: 7.6 MG/DL (ref 8.5–10.1)
CALCIUM SERPL-MCNC: 8 MG/DL (ref 8.5–10.1)
CHLORIDE SERPL-SCNC: 108 MMOL/L (ref 100–111)
CHLORIDE SERPL-SCNC: 108 MMOL/L (ref 100–111)
CO2 SERPL-SCNC: 19 MMOL/L (ref 21–32)
CO2 SERPL-SCNC: 21 MMOL/L (ref 21–32)
CREAT SERPL-MCNC: 1.52 MG/DL (ref 0.6–1.3)
CREAT SERPL-MCNC: 1.69 MG/DL (ref 0.6–1.3)
ERYTHROCYTE [DISTWIDTH] IN BLOOD BY AUTOMATED COUNT: 16.4 % (ref 11.6–14.5)
GLOBULIN SER CALC-MCNC: 3.4 G/DL (ref 2–4)
GLUCOSE SERPL-MCNC: 135 MG/DL (ref 74–99)
GLUCOSE SERPL-MCNC: 152 MG/DL (ref 74–99)
HCT VFR BLD AUTO: 29.1 % (ref 35–45)
HGB BLD-MCNC: 9.4 G/DL (ref 12–16)
MCH RBC QN AUTO: 26 PG (ref 24–34)
MCHC RBC AUTO-ENTMCNC: 32.3 G/DL (ref 31–37)
MCV RBC AUTO: 80.4 FL (ref 78–100)
NRBC # BLD: 0 K/UL (ref 0–0.01)
NRBC BLD-RTO: 0 PER 100 WBC
PHOSPHATE SERPL-MCNC: 2.7 MG/DL (ref 2.5–4.9)
PLATELET # BLD AUTO: 87 K/UL (ref 135–420)
PMV BLD AUTO: 10.7 FL (ref 9.2–11.8)
POTASSIUM SERPL-SCNC: 4.2 MMOL/L (ref 3.5–5.5)
POTASSIUM SERPL-SCNC: 4.3 MMOL/L (ref 3.5–5.5)
PROT SERPL-MCNC: 6 G/DL (ref 6.4–8.2)
RBC # BLD AUTO: 3.62 M/UL (ref 4.2–5.3)
SODIUM SERPL-SCNC: 136 MMOL/L (ref 136–145)
SODIUM SERPL-SCNC: 138 MMOL/L (ref 136–145)
WBC # BLD AUTO: 18.7 K/UL (ref 4.6–13.2)

## 2024-11-13 PROCEDURE — 2580000003 HC RX 258: Performed by: INTERNAL MEDICINE

## 2024-11-13 PROCEDURE — 6370000000 HC RX 637 (ALT 250 FOR IP): Performed by: INTERNAL MEDICINE

## 2024-11-13 PROCEDURE — 6360000002 HC RX W HCPCS: Performed by: INTERNAL MEDICINE

## 2024-11-13 PROCEDURE — 36415 COLL VENOUS BLD VENIPUNCTURE: CPT

## 2024-11-13 PROCEDURE — 85027 COMPLETE CBC AUTOMATED: CPT

## 2024-11-13 PROCEDURE — 1100000003 HC PRIVATE W/ TELEMETRY

## 2024-11-13 PROCEDURE — 82330 ASSAY OF CALCIUM: CPT

## 2024-11-13 PROCEDURE — 80053 COMPREHEN METABOLIC PANEL: CPT

## 2024-11-13 PROCEDURE — 97166 OT EVAL MOD COMPLEX 45 MIN: CPT

## 2024-11-13 PROCEDURE — 87040 BLOOD CULTURE FOR BACTERIA: CPT

## 2024-11-13 RX ORDER — CALCIUM GLUCONATE 20 MG/ML
2000 INJECTION, SOLUTION INTRAVENOUS ONCE
Status: COMPLETED | OUTPATIENT
Start: 2024-11-13 | End: 2024-11-13

## 2024-11-13 RX ORDER — CALCIUM GLUCONATE 20 MG/ML
1000 INJECTION, SOLUTION INTRAVENOUS ONCE
Status: COMPLETED | OUTPATIENT
Start: 2024-11-13 | End: 2024-11-13

## 2024-11-13 RX ADMIN — ONDANSETRON 4 MG: 2 INJECTION INTRAMUSCULAR; INTRAVENOUS at 18:22

## 2024-11-13 RX ADMIN — SODIUM CHLORIDE: 900 INJECTION, SOLUTION INTRAVENOUS at 05:07

## 2024-11-13 RX ADMIN — FLUTICASONE PROPIONATE 1 SPRAY: 50 SPRAY, METERED NASAL at 10:37

## 2024-11-13 RX ADMIN — DOCUSATE SODIUM 100 MG: 100 CAPSULE, LIQUID FILLED ORAL at 08:34

## 2024-11-13 RX ADMIN — MESALAMINE 800 MG: 400 CAPSULE, DELAYED RELEASE ORAL at 21:11

## 2024-11-13 RX ADMIN — FLUTICASONE PROPIONATE 1 SPRAY: 50 SPRAY, METERED NASAL at 20:34

## 2024-11-13 RX ADMIN — BACLOFEN 10 MG: 10 TABLET ORAL at 20:31

## 2024-11-13 RX ADMIN — CARBIDOPA AND LEVODOPA 1 TABLET: 25; 100 TABLET ORAL at 20:30

## 2024-11-13 RX ADMIN — SODIUM CHLORIDE, PRESERVATIVE FREE 10 ML: 5 INJECTION INTRAVENOUS at 20:38

## 2024-11-13 RX ADMIN — CARBIDOPA AND LEVODOPA 1 TABLET: 25; 100 TABLET ORAL at 08:34

## 2024-11-13 RX ADMIN — PIPERACILLIN AND TAZOBACTAM 3375 MG: 3; .375 INJECTION, POWDER, LYOPHILIZED, FOR SOLUTION INTRAVENOUS at 06:41

## 2024-11-13 RX ADMIN — MESALAMINE 800 MG: 400 CAPSULE, DELAYED RELEASE ORAL at 10:26

## 2024-11-13 RX ADMIN — LEVOFLOXACIN 750 MG: 5 INJECTION, SOLUTION INTRAVENOUS at 22:13

## 2024-11-13 RX ADMIN — CALCIUM GLUCONATE 1000 MG: 20 INJECTION, SOLUTION INTRAVENOUS at 14:07

## 2024-11-13 RX ADMIN — PIPERACILLIN AND TAZOBACTAM 3375 MG: 3; .375 INJECTION, POWDER, LYOPHILIZED, FOR SOLUTION INTRAVENOUS at 13:38

## 2024-11-13 RX ADMIN — SODIUM CHLORIDE, PRESERVATIVE FREE 10 ML: 5 INJECTION INTRAVENOUS at 08:33

## 2024-11-13 RX ADMIN — BACLOFEN 10 MG: 10 TABLET ORAL at 13:58

## 2024-11-13 RX ADMIN — GABAPENTIN 300 MG: 300 CAPSULE ORAL at 20:30

## 2024-11-13 RX ADMIN — DULOXETINE HYDROCHLORIDE 60 MG: 30 CAPSULE, DELAYED RELEASE ORAL at 08:33

## 2024-11-13 RX ADMIN — ROPINIROLE HYDROCHLORIDE 0.25 MG: 0.25 TABLET, FILM COATED ORAL at 08:34

## 2024-11-13 RX ADMIN — GABAPENTIN 300 MG: 300 CAPSULE ORAL at 08:33

## 2024-11-13 RX ADMIN — HEPARIN SODIUM 5000 UNITS: 5000 INJECTION INTRAVENOUS; SUBCUTANEOUS at 06:30

## 2024-11-13 RX ADMIN — TRAZODONE HYDROCHLORIDE 50 MG: 50 TABLET ORAL at 20:32

## 2024-11-13 RX ADMIN — ONDANSETRON 4 MG: 2 INJECTION INTRAMUSCULAR; INTRAVENOUS at 12:00

## 2024-11-13 RX ADMIN — MESALAMINE 800 MG: 400 CAPSULE, DELAYED RELEASE ORAL at 14:43

## 2024-11-13 RX ADMIN — CALCIUM GLUCONATE 2000 MG: 20 INJECTION, SOLUTION INTRAVENOUS at 01:41

## 2024-11-13 RX ADMIN — PIPERACILLIN AND TAZOBACTAM 3375 MG: 3; .375 INJECTION, POWDER, LYOPHILIZED, FOR SOLUTION INTRAVENOUS at 20:38

## 2024-11-13 RX ADMIN — GABAPENTIN 300 MG: 300 CAPSULE ORAL at 13:58

## 2024-11-13 RX ADMIN — CALCIUM 500 MG: 500 TABLET ORAL at 08:34

## 2024-11-13 RX ADMIN — CALCIUM 500 MG: 500 TABLET ORAL at 20:31

## 2024-11-13 RX ADMIN — CALCIUM GLUCONATE 2000 MG: 20 INJECTION, SOLUTION INTRAVENOUS at 08:35

## 2024-11-13 RX ADMIN — ATORVASTATIN CALCIUM 20 MG: 20 TABLET, FILM COATED ORAL at 20:32

## 2024-11-13 RX ADMIN — BACLOFEN 10 MG: 10 TABLET ORAL at 08:34

## 2024-11-13 ASSESSMENT — PAIN SCALES - GENERAL
PAINLEVEL_OUTOF10: 0

## 2024-11-13 ASSESSMENT — PAIN SCALES - WONG BAKER
WONGBAKER_NUMERICALRESPONSE: NO HURT

## 2024-11-13 NOTE — PROGRESS NOTES
Hospitalist Progress Note    Patient: Holly Garcia MRN: [id number]  CSN: [id number]    YOB: 1955  Age: 69 y.o.  Sex: female    DOA: 11/11/2024 LOS:  LOS: 2 days         Total duration of encounter: 2 days      Chief Complaint ;    69-year-old female with multiple sclerosis and chronic catheterization from neurogenic bladder admitted with sepsis leukocytosis tachycardia and fever from UTI with associated mental status change  Subjective ;    I feel weak and tired transferred to ICU post stent placement  Transient hypotension and tachycardia  No pressors started in ICU improved with fluids and albumin    Review of systems:  Constitutional: + fevers, chills, myalgias  Respiratory: denies SOB, cough  Cardiovascular: denies chest pain, palpitations  Gastrointestinal: denies nausea, vomiting, diarrhea    10 systems reviewed, all negative other than what is mentioned above.      Vital signs/Intake and Output:  Visit Vitals  /72   Pulse (!) 103   Temp 98.1 °F (36.7 °C) (Oral)   Resp 16   Ht 1.676 m (5' 5.98\")   Wt 78.9 kg (174 lb)   SpO2 98%   BMI 28.10 kg/m²     Current Shift:  11/13 0701 - 11/13 1900  In: -   Out: 400 [Urine:400]  Last three shifts:  11/11 1901 - 11/13 0700  In: 1250 [P.O.:250; I.V.:1000]  Out: 2375 [Urine:2370]    Exam:    General: Well developed, alert, NAD, to person place and situation as well as month but needs urging and redirection improved today knows she live in assisted living  Head/Neck: NCAT, supple, No masses, No lymphadenopathy  CVS:Regular rate and rhythm, no M/R/G, S1/S2 heard, no thrill  Lungs:Clear to auscultation bilaterally, no wheezes, rhonchi, or rales  Abdomen: Soft, Nontender, No distention, Normal Bowel sounds, No hepatomegaly  Extremities: No C/C/E, pulses palpable 2+  Skin:normal texture and turgor, no rashes, no lesions  Neuro:grossly normal , follows commands  Psych:appropriate    Labs: Results:       Chemistry Recent Labs     11/11/24  1658 11/12/24  0329  ago was 0.8  Start IV fluids  Avoid nephrotoxic agents  Improving post stent placement      Parkinson's  Restarting her home medication  PT OT    Discharge to; , [x] Home  [x]SNF/Rehab  []  Others  in  3 Days, []  stable for DC         Case discussed with:  [x]Patient  []Family  []Nursing  []Case Management [] Consultants  DVT Prophylaxis:  []Lovenox  []Hep SQ  []SCDs  []Coumadin, Eliquis, Xarelto, Pradaxa   []On Heparin gtt. [] No indication [] refused     TIME: 55 minutes were spent on the care of this patient today,  This time also includes physician non-face-to-face service time visit on the date of service such as  Preparing to see the patient (eg, review of tests)  Obtaining and/or reviewing separately obtained history  Performing a medically necessary appropriate examination and/or evaluation  Counseling and educating the patient/family/caregiver  Ordering medications, tests, or procedures  Referring and communicating with other health care professionals as needed  Documenting clinical information in the electronic or other health record  Independently interpreting results (not reported separately) and communicating results to the patient/family/caregiver  Care coordination and discharge planning with Case Management.      Dear patient, if you are reviewing this note and have a question regarding the medical terminology, please bring it with you to your next PCP visit.  Medical notes are meant to be a communication between medical professionals.  Additionally, portion of this note were created using voice recognition function, syntax and phonetic over may have escaped proofreading.    Shmuel Joel MD

## 2024-11-13 NOTE — PROGRESS NOTES
[facility] Hospitalist Service  At the heart of better care     Advance Care Planning   Admit Date:  2024  4:20 PM   Name:  Holly Garcia   Age:  69 y.o.  Sex:  female  :  1955   MRN:  [id number]   Room:  [facility]/    Holly Garcia has capacity to make her own decisions:   No    If pt unable to make decisions, POA/surrogate decision maker:  Son    Other people present:   None    Patient / surrogate decision-maker directed code status:  Full Code    Other ACP topics discussed, if applicable:   None    Patient or surrogate consented to discussion of the current conditions, workup, management plans, prognosis, and the risk for further deterioration.  Aggregate face-to-facetime, 17 minutes was spent discussing end-of-life care planning with patient/family and/or Power of . Discussed CPR, Intubation, treatment goals, and Quality of life/Intensity of care.    Signed:  Shmuel Joel MD

## 2024-11-13 NOTE — PROGRESS NOTES
Spoke with patient son and urology   CT of abdomen results noted  Urology will come in for stent placement   OR called  Update son verified code status

## 2024-11-13 NOTE — CONSULTS
Moderate Risk Nutrition Assessment Initial    Type and Reason for Visit: Initial, Consult  Nutrition consulted for poor PO/appetite x 5 days; appreciate consult.  Nutrition Recommendations/Plan:   Cont diet as ping and monitor need for swallow SLP eval given h/o Parkinsons  Consider decreasing to 45-60g CHO per meal and cont low Na, low fat diet  Cont Glucerna shakes BID PO as ping provides 440kcal, 20g pro for now to prevent spiking BG levels  If no BM soon consider adding scheduled bowel regimen  Check pending WOCN eval and need for Nephro-tricia daily   Cont to monitor POC, wt trends, renal fx, lytes, UOP, bowel fx, skin integrity and adjust recs as needed     Malnutrition Assessment:  Malnutrition Status: At risk for malnutrition    Nutrition Assessment:  68yo F transferred to ICU post op mild hypotension, pt w/UTI and obstructive R ureteral stone w/hydronephrosis; s/p cystoscopy and right ureteral stent placed 11/11/24; PMHx falls, Parkinson disease, RLS, MS, PK, bedbound status, anemia, neurogenic bladder, chronic indwelling [place] cath, freq UTIs, nephrolithiasis per MD.  nutrition consulted for poor PO/appetite. alert on room air. no pressors noted. wt up from hx last yr 62kg (6/21/23) if admit wt correct.  diuresing. good UOP. Noted h/o CKD.  No n/v noted. Pending WOCN eval for redness buttocks noted.    Estimated Daily Nutrient Needs:  Energy (kcal):  1800kcal/day Weight Used for Energy Requirements: Current     Protein (g):  85g pro/day Weight Used for Protein Requirements: Current        Fluid (ml/day):  1800ml fluid/day -- defer to MD based on fluid status, diuresing noted Method Used for Fluid Requirements: 1 ml/kcal    Nutrition Related Findings:   K 4.3, Cr 1.52, GFR 37, Ca 8.0, Phos 2.7; lipitor, oscal, sinemet, Nacl iv, s/p ca gluconate Wound Type: None pending wocn.    Current Nutrition Therapies:    ADULT DIET; Regular; 5 carb choices (75 gm/meal); Low Fat/Low Chol/High Fiber/HAYDEN; Low Sodium (2 gm);

## 2024-11-13 NOTE — WOUND CARE
Wound care nurse attempted to assess patient for consult for excoriated bottom.  Patient stated that she was very nauseous and could not turn.  Wound care nurse will attempt again as time permits.  Orders placed on chart for barrier cream.  La RN made aware.   Unable to assess

## 2024-11-13 NOTE — PROGRESS NOTES
Bedside and Verbal shift change report given to Beatrice RN (oncoming nurse) by Christiano rn (offgoing nurse). Report included the following information Nurse Handoff Report, Adult Overview, Intake/Output, MAR, Recent Results, and Med Rec Status.

## 2024-11-13 NOTE — PROGRESS NOTES
Occupational Therapy Goals:  Initiated 11/13/2024 to be met within 7-10 days.  Short Term Goals  Time Frame for Short Term Goals: 7 days  Short Term Goal 1: Patient will complete grooming EOB with setup A  Short Term Goal 2: Patient will complete bed to chair/bsc transfer with min A  Short Term Goal 3: Patient will complete LBD with min A and adaptive equipment as needed  Short Term Goal 4: Patient will complete toileting hygiene with min A    OCCUPATIONAL THERAPY EVALUATION    Patient: Holly Garcia (69 y.o. female)  Date: 11/13/2024  Primary Diagnosis: Metabolic encephalopathy [G93.41]  UTI (urinary tract infection) [N39.0]  Other migraine without status migrainosus, not intractable [G43.809]  Urinary tract infection without hematuria, site unspecified [N39.0]  Toxic metabolic encephalopathy [G92.8]  Urinary tract infection associated with catheterization of urinary tract, initial encounter ([facility]) [T83.511A, N39.0]  Sepsis ([facility]) [A41.9]  Procedure(s) (LRB):  CYSTOSCOPY RETROGRADE PYELOGRAM Stent placement (Right) 1 Day Post-Op   Precautions: Fall Risk,  ,  ,  ,  ,  ,  ,            PLOF: Patient completed toileting, chair<>chair/bed transfers, dressing, grooming, feeding independently. Patient requires assist for bathing and IADLs    ASSESSMENT :  RN cleared patient for participation in OT evaluation. Patient presented supine in bed with gripper socks, cardiac monitor, and IV line. Patient with no visitors present for entire session. Patient completed assessment of ADLs and BUE strength, ROM (see details below). Patient declined sitting EOB at this time, feels unable d/t nausea. Patient provided items for ADLs, such as oral hygiene, left supine with needs in reach. Due to deficits (listed below) patient has decreased independence with ADLs and functional mobility and would benefit from continued occupational therapy services.  ,      DEFICITS/IMPAIRMENTS:  Performance deficits / Impairments: Decreased functional  mobility ;Decreased high-level IADLs;Decreased ADL status;Decreased balance;Decreased posture    Patient will benefit from skilled intervention to address the above impairments.  Patient's rehabilitation potential is considered to be Prognosis: Good.  Factors which may influence rehabilitation potential include:   []             None noted  [x]             Mental ability/status  [x]             Medical condition  [x]             Home/family situation and support systems  [x]             Safety awareness  []             Pain tolerance/management  []             Other:      PLAN :  Recommendations and Planned Interventions:      [x]               Self Care/ ADL Training         [x]      Therapeutic Activities  [x]               Functional Mobility Training   []      Cognitive Retraining  [x]               Therapeutic Exercises           [x]      Endurance Activities  [x]               Balance Training                    []      Neuromuscular Re-Education  []               Visual/Perceptual Training     [x]      Home Safety Training  [x]               Patient Education                   [x]      Family Training/Education  [x]               Strengthening                        [x]      ROM  []               Wheelchair mobility                [x]     Pain management  [x]               Safety education/training       [x]     Positioning  []              Equipment education              []     Return to work training  []              Coordination training              []      Energy conservation  []              Home management training and IADLs  []               Other (comment):  Frequency/Duration: Patient will be followed by occupational therapy to address goals, 1 time per day/3-5 days per week to address goals.    Further Equipment Recommendations for Discharge:  ,          [facility]: AM-PAC Inpatient Daily Activity Raw Score: 12/24 -     Current research shows that an AM-PAC score of 17 or less is not associated with a

## 2024-11-13 NOTE — PROGRESS NOTES
Pt refused PT due to:  [x]  Nausea/vomiting  []  Eating  []  Pain  []  Pt lethargic  []  Off Unit  3 attempt with 3 refusals. RN is aware.  Will f/u. Thank you.  Fede Browne, PTA

## 2024-11-13 NOTE — CONSULTS
[facility]  Pulmonary, Critical Care, and Sleep Medicine    Name: Holly Garcia MRN: [id number]   : 1955 Hospital: [facility]    Date: 2024  Room: [address] [place] [address] Note                                              Consult requesting physician: Dr. Sandip Rivera  Reason for Consult: Hypotension    IMPRESSION:     Hypotension.  Sepsis.  Neuropathy, obstructive.  UTI.    Active Hospital Problems    Diagnosis Date Noted    Uropathy, obstructive [N13.9] 2024     Priority: High    Hypotension [I95.9] 2024    Urinary tract infection associated with catheterization of urinary tract, initial encounter ([facility]) [T83.511A, N39.0] 2024    MANDI (acute kidney injury) ([facility]) [N17.9] 2024    Sepsis ([facility]) [A41.9] 2024    UTI, complicated/cath-associated [N39.0] 2024    Toxic metabolic encephalopathy [G92.8] 2024    Neurogenic bladder [N31.9] 2016    Restless leg [G25.81] 2016    MS (multiple sclerosis) ([facility]) [G35] 2016        Code status: Full Code       RECOMMENDATIONS:     Respiratory: No acute pulmonary issues.  No acute issues. Protecting airway.  On room air.  CXR 2024: Clear lungs.  No acute pulmonary process.  Keep SPO2 >=92%. HOB 30 degree elevation all the time. Aggressive pulmonary toileting. Aspiration precautions. Incentive spirometry.    CVS: Mild hypotension intraoperatively or postoperatively; resolved.  Status post IV albumin.  IVF as below.  Monitor hemodynamics.  Continue Lipitor.    ID: Chronic indwelling Lizarraga catheter which is changed in OR on 2024.  UTI and sepsis.  Rapid influenza and COVID-19 2024: Negative.  UA 2024: Positive nitrites and large leukocyte esterase with 3+ bacteria.  Urine culture 2024: In process.  Blood culture 2024: NGTD.  Antibiotics: Continue Levaquin and Zosyn.  Follow cultures and de-escalate antibiotics appropriately.  Sepsis bundle  Status: Completed Specimen: Blood Updated: 11/13/24 0951     Special Requests NO SPECIAL REQUESTS        Culture NO GROWTH AFTER 3 HOURS       Culture, Urine [[id number]] Collected: 11/11/24 1841    Order Status: Sent Specimen: Urine, clean catch Updated: 11/12/24 1529    COVID-19 & Influenza Combo [[id number]] Collected: 11/11/24 1739    Order Status: Completed Specimen: Nasopharyngeal Updated: 11/11/24 1827     Source Nasopharyngeal        SARS-CoV-2, PCR Not detected        Comment: Not Detected results do not preclude SARS-CoV-2 infection and should not be used as the sole basis for patient management decisions.Results must be combined with clinical observations, patient history, and epidemiological information.        Rapid Influenza A By PCR Not detected        Rapid Influenza B By PCR Not detected        Comment: Testing was performed using spenser Maritza SARS-CoV-2 and Influenza A/B nucleic acid assay.  This test is a multiplex Real-Time Reverse Transcriptase Polymerase Chain Reaction (RT-PCR) based in vitro diagnostic test intended for the qualitative detection of nucleic acids from SARS-CoV-2, Influenza A, and Influenza B in nasopharyngeal specimens.                       CT ABDOMEN PELVIS WO CONTRAST Additional Contrast? None    Result Date: 11/12/2024  1.  There is an obstructing 8 mm stone in the distal right ureter with significant right-sided hydronephrosis. 2.  Bilateral nephrolithiasis. 3.  The gallbladder appears distended. No large calcified gallstones are identified. Electronically signed by SANDEEP Ferny    XR CHEST PORTABLE    Result Date: 11/11/2024  No acute intrathoracic process. Electronically signed by BRENDON BACH    CT HEAD WO CONTRAST    Result Date: 11/11/2024  No acute abnormality. Electronically signed by CRISTÓBAL NOBLE            ·Please note: Voice-recognition software may have been used to generate this report, which may have resulted in some phonetic-based errors in grammar and contents.

## 2024-11-13 NOTE — PROGRESS NOTES
Zosyn (Piperacillin/Tazobactam) Extended Infusion    Holly Garcia, a 69 y.o. yo female, has been converted to an extended infusion of Zosyn while in the intensive care unit.    A loading dose of 3.375 or 4.5 gm will be given over 30 minutes depending on indication.    Extended infusions will begin 4 hours after the initial dose if CrCl  >/= 20 ml/min or 8 hours after the initial dose if CrCl < 20 ml/min.    Extended infusions will run over 4 hours (240 minutes).    Invalid input(s): \"CREA\"  Ht Readings from Last 1 Encounters:   11/11/24 1.676 m (5' 6\")     Wt Readings from Last 1 Encounters:   11/11/24 78.9 kg (174 lb)       CrCl : Serum creatinine: 1.52 mg/dL (H) 11/13/24 0603  Estimated creatinine clearance: 38 mL/min (A)    Renal adjustment of extended infusion of Zosyn  3.375 or 4.5 gm every 8 hours for CrCl >/= 20 ml/min  3.375 or 4.5 gm every 12 hours for CrCl < 20 ml/min, intermittent HD or PD

## 2024-11-13 NOTE — PERIOP NOTE
TRANSFER - IN REPORT:    Verbal report received from OR nurse and CRNA on Holly Mygas  being received from OR for routine progression of patient care      Report consisted of patient's Situation, Background, Assessment and   Recommendations(SBAR).     Information from the following report(s) Nurse Handoff Report was reviewed with the receiving nurse.    Opportunity for questions and clarification was provided.      Assessment completed upon patient's arrival to unit and care assumed.  in

## 2024-11-13 NOTE — ANESTHESIA POSTPROCEDURE EVALUATION
Department of Anesthesiology  Postprocedure Note    Patient: Holly Garcia  MRN: [id number]  YOB: 1955  Date of evaluation: 11/12/2024    Procedure Summary       Date: 11/12/24 Room / Location: [facility] MAIN [facility]ST[facility] / [facility] MAIN OR    Anesthesia Start: 2121 Anesthesia Stop:     Procedure: CYSTOSCOPY RETROGRADE PYELOGRAM Stent placement (Right: Ureter) Diagnosis:       Kidney stone on right side      (Kidney stone on right side [N20.0])    Surgeons: Conchis Mendez MD Responsible Provider: Marcin Masters MD    Anesthesia Type: general ASA Status: 3 - Emergent            Anesthesia Type: No value filed.    Isadora Phase I: Isadora Score: 8    Isadora Phase II:      Anesthesia Post Evaluation    Patient location during evaluation: PACU  Level of consciousness: sleepy but conscious  Airway patency: patent  Nausea & Vomiting: no nausea  Cardiovascular status: hypotensive (slightly but going to ICU)  Respiratory status: BIPAP  Hydration status: euvolemic  Pain management: satisfactory to patient    No notable events documented.

## 2024-11-13 NOTE — PROGRESS NOTES
Occupational Therapy Evaluation/Treatment Attempt    Chart reviewed. Attempted Occupational Therapy Evaluation/Treatment, however, patient unable to be seen due to:  [x]  Nausea/vomiting  []  Eating  []  Pain  []  Patient too lethargic  []  Off Unit for testing/procedure  []  Dialysis treatment in progress   []  Telemetry Results  []  WB status not in order set  []  Patient on bedrest per order set  []  Other :   LIONEL Tovar recommending check back later  Will follow up later as patient's schedule allows.   Thank you for this referral.  Rere LING, OTR/L

## 2024-11-13 NOTE — PERIOP NOTE
TRANSFER - OUT REPORT:    Verbal report given to ICU RN on Holly Garcia  being transferred to ICU for routine progression of patient care       Report consisted of patient's Situation, Background, Assessment and   Recommendations(SBAR).     Information from the following report(s) Nurse Handoff Report was reviewed with the receiving nurse.           Lines:   Peripheral IV 11/12/24 Left;Proximal;Anterior Forearm (Active)   Site Assessment Clean, dry & intact 11/12/24 2217   Line Status Infusing 11/12/24 2217   Line Care Connections checked and tightened 11/12/24 2217   Phlebitis Assessment No symptoms 11/12/24 2217   Infiltration Assessment 0 11/12/24 2217   Alcohol Cap Used No 11/12/24 2217   Dressing Status Intact w/seal 11/12/24 2217   Dressing Type Transparent 11/12/24 2217        Opportunity for questions and clarification was provided.      Patient transported with:  Registered Nurse   02  Monitor

## 2024-11-13 NOTE — PROGRESS NOTES
TRANSFER - OUT REPORT:    Verbal report given to Radha FLOWERS on Holly Garcia  being transferred to OR for ordered procedure       Report consisted of patient's Situation, Background, Assessment and   Recommendations(SBAR).     Information from the following report(s) Nurse Handoff Report, Adult Overview, Intake/Output, MAR, and Recent Results was reviewed with the receiving nurse.           Lines:   Peripheral IV 11/12/24 Left;Proximal;Anterior Forearm (Active)        Opportunity for questions and clarification was provided.      Patient transported with:  Tech

## 2024-11-13 NOTE — ANESTHESIA PRE PROCEDURE
31 11/11/2024 04:58 PM    GLUCOSE 131 11/11/2024 04:58 PM    CALCIUM 8.8 11/11/2024 04:58 PM       POC Tests: No results for input(s): \"POCGLU\", \"POCNA\", \"POCK\", \"POCCL\", \"POCBUN\", \"POCHEMO\", \"POCHCT\" in the last 72 hours.    Coags: No results found for: \"PROTIME\", \"INR\", \"APTT\"    HCG (If Applicable): No results found for: \"PREGTESTUR\", \"PREGSERUM\", \"HCG\", \"HCGQUANT\"     ABGs: No results found for: \"PHART\", \"PO2ART\", \"RDV0FVO\", \"IPC5IFK\", \"BEART\", \"H3APNMOB\"     Type & Screen (If Applicable):  No results found for: \"ABORH\", \"LABANTI\"    Drug/Infectious Status (If Applicable):  No results found for: \"HIV\", \"HEPCAB\"    COVID-19 Screening (If Applicable):   Lab Results   Component Value Date/Time    COVID19 Not detected 11/11/2024 05:39 PM           Anesthesia Evaluation  Patient summary reviewed and Nursing notes reviewed   no history of anesthetic complications:   Airway: Mallampati: II     Neck ROM: full  Mouth opening: > = 3 FB   Dental: normal exam   (+) poor dentition      Pulmonary:Negative Pulmonary ROS and normal exam  breath sounds clear to auscultation                             Cardiovascular:Negative CV ROS            Rhythm: regular  Rate: normal                    Neuro/Psych:   (+) neuromuscular disease: Parkinson's disease, multiple sclerosis, headaches:            GI/Hepatic/Renal: Neg GI/Hepatic/Renal ROS            Endo/Other:                      ROS comment: Sepsis,  Abdominal: normal exam            Vascular: negative vascular ROS.         Other Findings:       Anesthesia Plan      general     ASA 3 - emergent       Induction: intravenous.      Anesthetic plan and risks discussed with patient and child/children.      Plan discussed with CRNA.                Marcin Masters MD   11/12/2024

## 2024-11-13 NOTE — CONSULTS
Department of Urology  Attending History and Physical        CHIEF COMPLAINT:  UtI fever and chills    Reason for Admission:  UTI sepsis, right ureteral stone w/ hydro    History Obtained From:  patient    HISTORY OF PRESENT ILLNESS:      The patient is a 69 y.o. female with significant past medical history of neurogenic bladder. Hx of Recurrent UTIs.   Admitted for UTI sepsis.   CT a/p showed:  There is significant right-sided hydronephrosis and hydroureter. There are  stones in the right kidney. There is a 0.8 x 0.7 cm obstructing stone in the  distal right ureter.  There are multiple stones in the left kidney. There is no left-sided  hydronephrosis. There are no stones in the left ureter.  Urinary catheter is noted in the bladder. There is air in the bladder. Bladder  wall prominence is likely due to under distention.    Past Medical History:        Diagnosis Date    MANDI (acute kidney injury) ([facility]) 11/12/2024    Anemia     Constipation     Headache     Leg pain     Multiple sclerosis ([facility])     Murmur     Muscle atrophy     Restless leg 3/12/2016     Past Surgical History:        Procedure Laterality Date    COLONOSCOPY N/A 2/16/2022    DIAGNOSTIC COLONOSCOPY to Mid-transverse Colon performed by Merrill Martinez MD at [facility] ENDOSCOPY    COLONOSCOPY N/A 10/25/2018    COLONOSCOPY, DIAGNOSTIC with bx performed by Merrill Martinez MD at [facility] ENDOSCOPY    HYSTERECTOMY (CERVIX STATUS UNKNOWN)      ORTHOPEDIC SURGERY Right     tendon surgery to wrist    TONSILLECTOMY         Medications Prior to Admission:   Medications Prior to Admission: furosemide (LASIX) 20 MG tablet, Take 0.5 tablets by mouth daily  butalbital-acetaminophen-caffeine (FIORICET, ESGIC) -40 MG per tablet, Take 1 tablet by mouth every 6 hours as needed for Headaches or Migraine  azelastine (ASTELIN) 0.1 % nasal spray, 1 spray 2 times daily Takes at 0900 1600  traZODone (DESYREL) 50 MG tablet, Take 1 tablet by mouth nightly Takes at  6\")   Wt 78.9 kg (174 lb)   SpO2 92%   BMI 28.08 kg/m²   URINARY CATHETER OUTPUT (Rapp):     CONSTITUTIONAL:  awake, alert, cooperative, no apparent distress, and appears stated age  ABDOMEN:  non-distended  GENITAL/URINARY:  rapp cath in place. Urine celia.   NEUROLOGIC:  alert, oriented x 3    DATA:  Wbc 27  Cr 1.7      Urinalysis  Order: [id number]  Status: Final result       Visible to patient: No (not released)       Next appt: None    0 Result Notes      Component  Ref Range & Units 11/11/24 1841   Color, UA    YELLOW   Appearance    CLOUDY   Specific Gravity, UA  1.005 - 1.030   1.006   pH, Urine  5.0 - 8.0   7.5   Protein, UA  NEG mg/dL TRACE Abnormal    Glucose, Ur  NEG mg/dL Negative   Ketones, Urine  NEG mg/dL Negative   Bilirubin, Urine  NEG   Negative   Blood, Urine  NEG   Negative   Urobilinogen, Urine  0.2 - 1.0 EU/dL 0.2   Nitrite, Urine  NEG   Positive Abnormal    Leukocyte Esterase, Urine  NEG   LARGE Abnormal             ASSESSMENT AND PLAN:    Principal Problem:    UTI, complicated/cath-associated  Active Problems:    Neurogenic bladder      Restless leg      MS (multiple sclerosis) ([facility])      Toxic metabolic encephalopathy      Urinary tract infection associated with catheterization of urinary tract, initial encounter ([facility])      MANDI (acute kidney injury) ([facility])      Sepsis ([facility])      Plan:  Urgent need for cysto, right stent placement. Discussed with pt and son. They agree.   Definitive stone treatment as outpatient once infection resolved.

## 2024-11-13 NOTE — PLAN OF CARE
Problem: Pain  Goal: Verbalizes/displays adequate comfort level or baseline comfort level  Outcome: Progressing     Problem: Safety - Adult  Goal: Free from fall injury  Outcome: Progressing     Problem: Confusion  Goal: Confusion, delirium, dementia, or psychosis is improved or at baseline  Description: INTERVENTIONS:  1. Assess for possible contributors to thought disturbance, including medications, impaired vision or hearing, underlying metabolic abnormalities, dehydration, psychiatric diagnoses, and notify attending LIP  2. [place] high risk fall precautions, as indicated  3. Provide frequent short contacts to provide reality reorientation, refocusing and direction  4. Decrease environmental stimuli, including noise as appropriate  5. Monitor and intervene to maintain adequate nutrition, hydration, elimination, sleep and activity  6. If unable to ensure safety without constant attention obtain sitter and review sitter guidelines with assigned personnel  7. Initiate Psychosocial CNS and Spiritual Care consult, as indicated  Outcome: Progressing  Flowsheets (Taken 11/13/2024 1142)  Effect of thought disturbance (confusion, delirium, dementia, or psychosis) are managed with adequate functional status: Assess for contributors to thought disturbance, including medications, impaired vision or hearing, underlying metabolic abnormalities, dehydration, psychiatric diagnoses, notify LIP     Problem: Skin/Tissue Integrity  Goal: Absence of new skin breakdown  Description: 1.  Monitor for areas of redness and/or skin breakdown  2.  Assess vascular access sites hourly  3.  Every 4-6 hours minimum:  Change oxygen saturation probe site  4.  Every 4-6 hours:  If on nasal continuous positive airway pressure, respiratory therapy assess nares and determine need for appliance change or resting period.  Outcome: Progressing     Problem: Discharge Planning  Goal: Discharge to home or other facility with appropriate

## 2024-11-13 NOTE — OP NOTE
Operative Note      Patient: Holly Garcia  YOB: 1955  MRN: [id number]    Date of Procedure: 11/12/2024    Pre-Op Diagnosis Codes:   Right ureteral stone with hydronephrosis  UTI sepsis  Neurogenic bladder  Chronic indwelling rapp catheter    Post-Op Diagnosis: Same       Procedure(s):  CYSTOSCOPY RIGHT RETROGRADE PYELOGRAM RIGHT URETERAL STENT INSERTION WITH C-ARM    Surgeon(s):  Conchis Mendez MD    Assistant:   Surgical Assistant: Rosita Littlejohn    Anesthesia: General    Estimated Blood Loss (mL): Minimal    Complications: None    Specimens:   * No specimens in log *    Implants:  Implant Name Type Inv. Item Serial No.  Lot No. LRB No. Used Action   STENT URET 5FR L24CM PERCFLX HYDR+ DBL PGTL THRD 2 - [id number]  STENT URET 5FR L24CM PERCFLX HYDR+ DBL PGTL THRD 2  [employer]- [id number] Right 1 Implanted         Drains:   Ureteral Drain/Stent 11/12/24 Right Ureter (Active)       Urinary Catheter 11/11/24 (Active)   $ Urethral catheter insertion $ Not inserted for procedure 11/11/24 2330   Catheter Indications Urinary retention (acute or chronic), continuous bladder irrigation or bladder outlet obstruction 11/12/24 2000   Site Assessment No urethral drainage 11/12/24 2000   Urine Color Yellow 11/12/24 2000   Urine Appearance Clear 11/12/24 2000   Collection Container Standard 11/12/24 2000   Securement Method Securing device (Describe) 11/12/24 2000   Catheter Care  Perineal wipes 11/11/24 2330   Catheter Best Practices  Drainage tube clipped to bed;Catheter secured to thigh;Tamper seal intact;Bag below bladder;Bag not on floor;Lack of dependent loop in tubing;Drainage bag less than half full 11/12/24 2000   Status Draining;Patent 11/12/24 2000   Output (mL) 200 mL 11/12/24 0705       Urinary Catheter 11/12/24 Rapp (Active)       Findings:  Infection Present At Time Of Surgery (PATOS) (choose all levels that have infection present): Wound class 3 UTI infection.  Other

## 2024-11-14 LAB
ANION GAP SERPL CALC-SCNC: 3 MMOL/L (ref 3–18)
BUN SERPL-MCNC: 26 MG/DL (ref 7–18)
BUN/CREAT SERPL: 22 (ref 12–20)
CALCIUM SERPL-MCNC: 9 MG/DL (ref 8.5–10.1)
CHLORIDE SERPL-SCNC: 109 MMOL/L (ref 100–111)
CO2 SERPL-SCNC: 25 MMOL/L (ref 21–32)
CREAT SERPL-MCNC: 1.17 MG/DL (ref 0.6–1.3)
ERYTHROCYTE [DISTWIDTH] IN BLOOD BY AUTOMATED COUNT: 16.1 % (ref 11.6–14.5)
GLUCOSE SERPL-MCNC: 101 MG/DL (ref 74–99)
HCT VFR BLD AUTO: 30.8 % (ref 35–45)
HGB BLD-MCNC: 9.9 G/DL (ref 12–16)
MAGNESIUM SERPL-MCNC: 1.9 MG/DL (ref 1.6–2.6)
MCH RBC QN AUTO: 25.7 PG (ref 24–34)
MCHC RBC AUTO-ENTMCNC: 32.1 G/DL (ref 31–37)
MCV RBC AUTO: 80 FL (ref 78–100)
NRBC # BLD: 0 K/UL (ref 0–0.01)
NRBC BLD-RTO: 0 PER 100 WBC
PLATELET # BLD AUTO: 107 K/UL (ref 135–420)
PMV BLD AUTO: 10.6 FL (ref 9.2–11.8)
POTASSIUM SERPL-SCNC: 3.9 MMOL/L (ref 3.5–5.5)
RBC # BLD AUTO: 3.85 M/UL (ref 4.2–5.3)
SODIUM SERPL-SCNC: 137 MMOL/L (ref 136–145)
WBC # BLD AUTO: 20.1 K/UL (ref 4.6–13.2)

## 2024-11-14 PROCEDURE — 2580000003 HC RX 258: Performed by: INTERNAL MEDICINE

## 2024-11-14 PROCEDURE — 36415 COLL VENOUS BLD VENIPUNCTURE: CPT

## 2024-11-14 PROCEDURE — 6370000000 HC RX 637 (ALT 250 FOR IP): Performed by: INTERNAL MEDICINE

## 2024-11-14 PROCEDURE — 1100000000 HC RM PRIVATE

## 2024-11-14 PROCEDURE — 97535 SELF CARE MNGMENT TRAINING: CPT

## 2024-11-14 PROCEDURE — 97110 THERAPEUTIC EXERCISES: CPT

## 2024-11-14 PROCEDURE — 97530 THERAPEUTIC ACTIVITIES: CPT

## 2024-11-14 PROCEDURE — 83735 ASSAY OF MAGNESIUM: CPT

## 2024-11-14 PROCEDURE — 80048 BASIC METABOLIC PNL TOTAL CA: CPT

## 2024-11-14 PROCEDURE — 85027 COMPLETE CBC AUTOMATED: CPT

## 2024-11-14 PROCEDURE — 6360000002 HC RX W HCPCS: Performed by: INTERNAL MEDICINE

## 2024-11-14 RX ADMIN — GABAPENTIN 300 MG: 300 CAPSULE ORAL at 13:43

## 2024-11-14 RX ADMIN — DULOXETINE HYDROCHLORIDE 60 MG: 30 CAPSULE, DELAYED RELEASE ORAL at 09:09

## 2024-11-14 RX ADMIN — ONDANSETRON 4 MG: 2 INJECTION INTRAMUSCULAR; INTRAVENOUS at 00:55

## 2024-11-14 RX ADMIN — MESALAMINE 800 MG: 400 CAPSULE, DELAYED RELEASE ORAL at 20:47

## 2024-11-14 RX ADMIN — PIPERACILLIN AND TAZOBACTAM 3375 MG: 3; .375 INJECTION, POWDER, LYOPHILIZED, FOR SOLUTION INTRAVENOUS at 20:37

## 2024-11-14 RX ADMIN — MESALAMINE 800 MG: 400 CAPSULE, DELAYED RELEASE ORAL at 09:09

## 2024-11-14 RX ADMIN — ROPINIROLE HYDROCHLORIDE 0.25 MG: 0.25 TABLET, FILM COATED ORAL at 09:09

## 2024-11-14 RX ADMIN — CALCIUM 500 MG: 500 TABLET ORAL at 20:30

## 2024-11-14 RX ADMIN — CARBIDOPA AND LEVODOPA 1 TABLET: 25; 100 TABLET ORAL at 20:30

## 2024-11-14 RX ADMIN — PIPERACILLIN AND TAZOBACTAM 3375 MG: 3; .375 INJECTION, POWDER, LYOPHILIZED, FOR SOLUTION INTRAVENOUS at 13:37

## 2024-11-14 RX ADMIN — FLUTICASONE PROPIONATE 1 SPRAY: 50 SPRAY, METERED NASAL at 09:11

## 2024-11-14 RX ADMIN — GABAPENTIN 300 MG: 300 CAPSULE ORAL at 20:30

## 2024-11-14 RX ADMIN — ONDANSETRON 4 MG: 2 INJECTION INTRAMUSCULAR; INTRAVENOUS at 09:08

## 2024-11-14 RX ADMIN — PIPERACILLIN AND TAZOBACTAM 3375 MG: 3; .375 INJECTION, POWDER, LYOPHILIZED, FOR SOLUTION INTRAVENOUS at 05:07

## 2024-11-14 RX ADMIN — CALCIUM 500 MG: 500 TABLET ORAL at 09:12

## 2024-11-14 RX ADMIN — ACETAMINOPHEN 650 MG: 325 TABLET ORAL at 19:56

## 2024-11-14 RX ADMIN — SODIUM CHLORIDE, PRESERVATIVE FREE 10 ML: 5 INJECTION INTRAVENOUS at 20:28

## 2024-11-14 RX ADMIN — CARBIDOPA AND LEVODOPA 1 TABLET: 25; 100 TABLET ORAL at 09:10

## 2024-11-14 RX ADMIN — BACLOFEN 10 MG: 10 TABLET ORAL at 20:29

## 2024-11-14 RX ADMIN — MESALAMINE 800 MG: 400 CAPSULE, DELAYED RELEASE ORAL at 13:43

## 2024-11-14 RX ADMIN — TRAZODONE HYDROCHLORIDE 50 MG: 50 TABLET ORAL at 20:30

## 2024-11-14 RX ADMIN — BACLOFEN 10 MG: 10 TABLET ORAL at 09:10

## 2024-11-14 RX ADMIN — SODIUM CHLORIDE, PRESERVATIVE FREE 10 ML: 5 INJECTION INTRAVENOUS at 09:11

## 2024-11-14 RX ADMIN — BACLOFEN 10 MG: 10 TABLET ORAL at 13:43

## 2024-11-14 RX ADMIN — ATORVASTATIN CALCIUM 20 MG: 20 TABLET, FILM COATED ORAL at 20:29

## 2024-11-14 RX ADMIN — GABAPENTIN 300 MG: 300 CAPSULE ORAL at 09:09

## 2024-11-14 ASSESSMENT — PAIN DESCRIPTION - LOCATION: LOCATION: HEAD

## 2024-11-14 ASSESSMENT — PAIN SCALES - GENERAL: PAINLEVEL_OUTOF10: 5

## 2024-11-14 ASSESSMENT — PAIN DESCRIPTION - DESCRIPTORS: DESCRIPTORS: PRESSURE

## 2024-11-14 ASSESSMENT — PAIN DESCRIPTION - ORIENTATION: ORIENTATION: ANTERIOR;LOWER

## 2024-11-14 NOTE — PROGRESS NOTES
Hospitalist Progress Note    Patient: Holly Garcia MRN: [id number]  CSN: [id number]    YOB: 1955  Age: 69 y.o.  Sex: female    DOA: 11/11/2024 LOS:  LOS: 3 days         Total duration of encounter: 3 days      Chief Complaint ;    69-year-old female with multiple sclerosis and chronic catheterization from neurogenic bladder admitted with sepsis leukocytosis tachycardia and fever from UTI with associated mental status change  Subjective ;    I feel weak and tired transferred to ICU post stent placement  Transient hypotension and tachycardia  No pressors started in ICU improved with fluids and albumin    Review of systems:  Constitutional: + fevers, chills, myalgias  Respiratory: denies SOB, cough  Cardiovascular: denies chest pain, palpitations  Gastrointestinal: denies nausea, vomiting, diarrhea    10 systems reviewed, all negative other than what is mentioned above.      Vital signs/Intake and Output:  Visit Vitals  /84   Pulse 90   Temp 98.2 °F (36.8 °C) (Oral)   Resp 18   Ht 1.676 m (5' 5.98\")   Wt 78.9 kg (174 lb)   SpO2 99%   BMI 28.10 kg/m²     Current Shift:  11/14 0701 - 11/14 1900  In: 312.4 [P.O.:260]  Out: 525 [Urine:525]  Last three shifts:  11/12 1901 - 11/14 0700  In: 3507.2 [P.O.:440; I.V.:2195.2]  Out: 4275 [Urine:4270]    Exam:    General: Well developed, alert, NAD, to person place and situation as well as month but needs urging and redirection improved today knows she live in assisted living  Head/Neck: NCAT, supple, No masses, No lymphadenopathy  CVS:Regular rate and rhythm, no M/R/G, S1/S2 heard, no thrill  Lungs:Clear to auscultation bilaterally, no wheezes, rhonchi, or rales  Abdomen: Soft, Nontender, No distention, Normal Bowel sounds, No hepatomegaly  Extremities: No C/C/E, pulses palpable 2+  Skin:normal texture and turgor, no rashes, no lesions  Neuro:grossly normal , follows commands  Psych:appropriate    Labs: Results:       Chemistry Recent Labs     11/12/24  [id number]

## 2024-11-14 NOTE — CARE COORDINATION
The patient is agreeable to DC to SNF. FOC was offered and patient would like to go to [facility].

## 2024-11-14 NOTE — PROGRESS NOTES
Physical Therapy Goals:  Continued 11/14/2024 to be met within 7-10 days.  Short Term Goals  Short Term Goal 1: Patient will perform rolling R/L with independence  Short Term Goal 2: Patient will perform supine to/from sit with supervision  Short Term Goal 3: Patient will perform sit to/from stand with Anuj in prep for gait training  Short Term Goal 4: Patient will perform bed to/from chair transfer with Anuj to progress OOB mobility  PHYSICAL THERAPY TREATMENT    Patient: Holly Garcia (69 y.o. female)  Date: 11/14/2024  Diagnosis: Metabolic encephalopathy [G93.41]  UTI (urinary tract infection) [N39.0]  Other migraine without status migrainosus, not intractable [G43.809]  Urinary tract infection without hematuria, site unspecified [N39.0]  Toxic metabolic encephalopathy [G92.8]  Urinary tract infection associated with catheterization of urinary tract, initial encounter ([facility]) [T83.511A, N39.0]  Sepsis ([facility]) [A41.9] UTI (urinary tract infection)  Procedure(s) (LRB):  CYSTOSCOPY RETROGRADE PYELOGRAM Stent placement (Right) 2 Days Post-Op  Precautions: Fall Risk    ASSESSMENT:  Patient required motivation to participate. Patient required Min-ModA for transition to sitting EOB. Performed dynamic balance activities requires up to Anuj to stabilize. Performed LE there ex for strengthening. Will continue to follow patient and progress mobility as able.    Progression toward goals:   []      Improving appropriately and progressing toward goals  [x]      Improving slowly and progressing toward goals  []      Not making progress toward goals and plan of care will be adjusted     PLAN:  Patient continues to benefit from skilled intervention to address the above impairments.  Continue treatment per established plan of care.    Further Equipment Recommendations for Discharge:   No     AMPAC:   AM-PAC Inpatient Mobility without Stair Climbing Raw Score : 9    This AMPAC score should be considered in conjunction with

## 2024-11-14 NOTE — CARE COORDINATION
SNF Authorization Request  11/14/2024, 2:28 PM    Patient Name: Holly Garcia                   YOB: 1955    Patient has been provided with freedom of choice and has chosen to go to [facility]    SNF has confirmed that they can accept the patient and they have a bed Yes  SNF has confirmed that they are in network with the patient's insurance Yes    Please request authorization.    Estimated date of discharge is 11/15    Skilled Need    PT Yes   OT Yes   Speech therapy No   Wound Care No  IV MedicationsNo  Trach No   Peg No     If PT/ OT/ Speech is required, evaluations/ notes have been updated within the last 48 hours Yes       Additional information N/A    Payor: Payor: [employer] MEDICARE / Plan: [employer] GOLD PLUS HMO / Product Type: *No Product type* /   Attending physician: Shmuel Joel MD Morgan Marquez, RN  Case Management Department

## 2024-11-14 NOTE — PROGRESS NOTES
Occupational Therapy Goals:  Initiated 11/14/2024 to be met within 7-10 days.  Short Term Goals  Time Frame for Short Term Goals: 7 days  Short Term Goal 1: Patient will complete grooming EOB with setup A  Short Term Goal 2: Patient will complete bed to chair/bsc transfer with min A  Short Term Goal 3: Patient will complete LBD with min A and adaptive equipment as needed  Short Term Goal 4: Patient will complete toileting hygiene with min A      OCCUPATIONAL THERAPY TREATMENT    Patient: Holly Garcia (69 y.o. female)  Date: 11/14/2024  Diagnosis: Metabolic encephalopathy [G93.41]  UTI (urinary tract infection) [N39.0]  Other migraine without status migrainosus, not intractable [G43.809]  Urinary tract infection without hematuria, site unspecified [N39.0]  Toxic metabolic encephalopathy [G92.8]  Urinary tract infection associated with catheterization of urinary tract, initial encounter ([facility]) [T83.511A, N39.0]  Sepsis ([facility]) [A41.9] UTI (urinary tract infection)  Procedure(s) (LRB):  CYSTOSCOPY RETROGRADE PYELOGRAM Stent placement (Right) 2 Days Post-Op  Precautions: Fall Risk,  ,  ,  ,  ,  ,  ,            PLOF: See evaluation    Chart, occupational therapy assessment, plan of care, and goals were reviewed.  ASSESSMENT:  Patient presented supine in bed with gripper socks and cardiac monitor. Patient with no visitors present for entire session.  Patient completed supine to sit, grooming sitting EOB, and upper body dressing minimum assist. Patient assist to open all containers, applied toothpaste to toothbrush for oral hygiene. Handed washcloth for facewashing, assist to adjust gown for deodorant don. Due to deficits in balance, coordination, strength, endurance, and safety awareness patient has decreased independence with ADLs and functional mobility, and would benefit from continued occupational therapy services at discharge.     Progression toward goals:  []          Improving appropriately and progressing toward  Intervention(s): Rest, Ice, Repositioning   Response to intervention: Nurse notified    Activity Tolerance:    Activity Tolerance: Patient tolerated treatment well;Patient limited by endurance;Treatment limited secondary to medical complications (nausea)  Please refer to the flowsheet for vital signs taken during this treatment.  After treatment:   Safety Devices  Type of Devices: Left in bed, Call light within reach     COMMUNICATION/EDUCATION:   Patient Education  Education Given To: Patient  Education Provided: Role of Therapy;Plan of Care;ADL Adaptive Strategies;Transfer Training  Education Method: Verbal  Barriers to Learning: None  Education Outcome: Verbalized understanding;Demonstrated understanding;Continued education needed      Thank you for this referral.  ANURADHA Chavez, OTR/L  Minutes: 32

## 2024-11-14 NOTE — PROGRESS NOTES
[facility]     Address: [address] [place] [address]    Phone: [phone]    SW called and LM for JOSEF Marie at facility regarding return when stable. SW to follow.

## 2024-11-14 NOTE — PROGRESS NOTES
met patient at bedside for an initial visit.    Patient came to the hospital with severe headache. Patient said medication is making her feel hot and cold. Patient moved from [place] to be close to her son. Patient thanked  for the visit.     provided presence and support for patient.    Chaplains will provide follow-up care for patient and family as needed.    Spiritual Health History and Assessment/Progress Note  [facility]    Spiritual/Emotional Needs,  ,  ,      Name: Holly Garcia MRN: [id number]    Age: 69 y.o.     Sex: female   Language: English   Confucianism: Episcopalian   UTI (urinary tract infection)     Date: 11/14/2024            Total Time Calculated: 25 min              Spiritual Assessment began in [address] CARDIAC/MEDICAL            Encounter Overview/Reason: Spiritual/Emotional Needs  Service Provided For: Patient    Briana, Belief, Meaning:   Patient identifies as spiritual, is connected with a briana tradition or spiritual practice, and has beliefs or practices that help with coping during difficult times  Family/Friends No family/friends present      Importance and Influence:  Patient has spiritual/personal beliefs that influence decisions regarding their health  Family/Friends No family/friends present    Community:  Patient feels well-supported. Support system includes: Children  Family/Friends No family/friends present    Assessment and Plan of Care:     Patient Interventions include: Facilitated expression of thoughts and feelings, Explored spiritual coping/struggle/distress, and Affirmed coping skills/support systems  Family/Friends Interventions include: No family/friends present    Patient Plan of Care: No spiritual needs identified for follow-up  Family/Friends Plan of Care: No family/friends present    Electronically signed by Chaplain Qi on 11/14/2024 at 11:50 AM

## 2024-11-14 NOTE — PROGRESS NOTES
Antimicrobial Stewardship Chart review:    With focus on prescribed Antimicrobials, reviewed clinical presentation that includes fever and VS curve or trend, labs, Microbiology, imaging reports and notes as appropriate.    Based on this brief analysis, here below are the suggestion.    Diagnostic indication in chart for the Antimicrobial/s: obstructive uropathy, post stent. Proteus in urine culture--no growth on blood culture    CURRENT ANTIMICROBIALS: pip tazo    DISCONTINUE THE FOLLOWING ANTIBIOTIC(S): TBD      Rationale:    (  )  No evidence of bacterial infection                          (  )  Microbiology report does not support use                          (  )  Narrowing broad-spectrum empiric coverage                          (  )  Therapeutic duplication: overlapping antimicrobial spectrum                          (  )  Clinical situation dictates change                          (  )  Prolonged duration of therapy not needed                          (  )  Allergy/toxicity/drug interaction present                          (  ) More clinically/cost effective therapy available  ADD ANTIBIOTICS  Rationale:        (  ) Microbiology report supports use                          (  ) Expanded coverage needed: gm positive /negative / anaerobic / atypical infection possible                          (  ) More clinicaly/cost effective therapy than current regimen                          (  ) Needed for synergy                          (  ) Double coverage needed                          (  ) Less toxic therapy                          (  ) Antifungal therapy needed    No  Change in Antibiotics: ( X) can streamline it to CTX or other narrow spectrum antibiotics later today once SS is out                               COMMENTS:       This communication is not a consultation. If further/additional analysis of the case is desired, please consider ID consultation.    Deisi Castano MD  [facility]

## 2024-11-14 NOTE — PLAN OF CARE
Problem: Pain  Goal: Verbalizes/displays adequate comfort level or baseline comfort level  11/13/2024 2238 by Teagan Hassan RN  Outcome: Progressing  Flowsheets (Taken 11/13/2024 2017)  Verbalizes/displays adequate comfort level or baseline comfort level:   Encourage patient to monitor pain and request assistance   Assess pain using appropriate pain scale   Administer analgesics based on type and severity of pain and evaluate response     Problem: Safety - Adult  Goal: Free from fall injury  11/13/2024 2238 by Teagan Hassan RN  Outcome: Progressing  Flowsheets (Taken 11/13/2024 2100)  Free From Fall Injury: Instruct family/caregiver on patient safety     Problem: Confusion  Goal: Confusion, delirium, dementia, or psychosis is improved or at baseline  Description: INTERVENTIONS:  1. Assess for possible contributors to thought disturbance, including medications, impaired vision or hearing, underlying metabolic abnormalities, dehydration, psychiatric diagnoses, and notify attending LIP  2. [place] high risk fall precautions, as indicated  3. Provide frequent short contacts to provide reality reorientation, refocusing and direction  4. Decrease environmental stimuli, including noise as appropriate  5. Monitor and intervene to maintain adequate nutrition, hydration, elimination, sleep and activity  6. If unable to ensure safety without constant attention obtain sitter and review sitter guidelines with assigned personnel  7. Initiate Psychosocial CNS and Spiritual Care consult, as indicated  11/13/2024 2238 by Teagan Hassan RN  Outcome: Progressing  Flowsheets (Taken 11/13/2024 2017)  Effect of thought disturbance (confusion, delirium, dementia, or psychosis) are managed with adequate functional status:   Assess for contributors to thought disturbance, including medications, impaired vision or hearing, underlying metabolic abnormalities, dehydration, psychiatric diagnoses, notify LIP   [facility]

## 2024-11-14 NOTE — PLAN OF CARE
Problem: Pain  Goal: Verbalizes/displays adequate comfort level or baseline comfort level  Outcome: Progressing     Problem: Safety - Adult  Goal: Free from fall injury  Outcome: Progressing     Problem: Confusion  Goal: Confusion, delirium, dementia, or psychosis is improved or at baseline  Description: INTERVENTIONS:  1. Assess for possible contributors to thought disturbance, including medications, impaired vision or hearing, underlying metabolic abnormalities, dehydration, psychiatric diagnoses, and notify attending LIP  2. [place] high risk fall precautions, as indicated  3. Provide frequent short contacts to provide reality reorientation, refocusing and direction  4. Decrease environmental stimuli, including noise as appropriate  5. Monitor and intervene to maintain adequate nutrition, hydration, elimination, sleep and activity  6. If unable to ensure safety without constant attention obtain sitter and review sitter guidelines with assigned personnel  7. Initiate Psychosocial CNS and Spiritual Care consult, as indicated  Outcome: Progressing  Flowsheets (Taken 11/14/2024 0815)  Effect of thought disturbance (confusion, delirium, dementia, or psychosis) are managed with adequate functional status: Assess for contributors to thought disturbance, including medications, impaired vision or hearing, underlying metabolic abnormalities, dehydration, psychiatric diagnoses, notify LIP     Problem: Skin/Tissue Integrity  Goal: Absence of new skin breakdown  Description: 1.  Monitor for areas of redness and/or skin breakdown  2.  Assess vascular access sites hourly  3.  Every 4-6 hours minimum:  Change oxygen saturation probe site  4.  Every 4-6 hours:  If on nasal continuous positive airway pressure, respiratory therapy assess nares and determine need for appliance change or resting period.  Outcome: Progressing     Problem: Discharge Planning  Goal: Discharge to home or other facility with appropriate

## 2024-11-15 VITALS
OXYGEN SATURATION: 99 % | SYSTOLIC BLOOD PRESSURE: 123 MMHG | RESPIRATION RATE: 18 BRPM | DIASTOLIC BLOOD PRESSURE: 80 MMHG | TEMPERATURE: 98.1 F | HEIGHT: 66 IN | WEIGHT: 174 LBS | HEART RATE: 99 BPM | BODY MASS INDEX: 27.97 KG/M2

## 2024-11-15 LAB
ANION GAP SERPL CALC-SCNC: 7 MMOL/L (ref 3–18)
BACTERIA SPEC CULT: ABNORMAL
BACTERIA SPEC CULT: ABNORMAL
BUN SERPL-MCNC: 20 MG/DL (ref 7–18)
BUN/CREAT SERPL: 18 (ref 12–20)
CALCIUM SERPL-MCNC: 8.5 MG/DL (ref 8.5–10.1)
CC UR VC: ABNORMAL
CHLORIDE SERPL-SCNC: 108 MMOL/L (ref 100–111)
CO2 SERPL-SCNC: 24 MMOL/L (ref 21–32)
CREAT SERPL-MCNC: 1.12 MG/DL (ref 0.6–1.3)
ERYTHROCYTE [DISTWIDTH] IN BLOOD BY AUTOMATED COUNT: 16.4 % (ref 11.6–14.5)
GLUCOSE SERPL-MCNC: 93 MG/DL (ref 74–99)
HCT VFR BLD AUTO: 33.5 % (ref 35–45)
HGB BLD-MCNC: 10.4 G/DL (ref 12–16)
MAGNESIUM SERPL-MCNC: 2 MG/DL (ref 1.6–2.6)
MCH RBC QN AUTO: 25.2 PG (ref 24–34)
MCHC RBC AUTO-ENTMCNC: 31 G/DL (ref 31–37)
MCV RBC AUTO: 81.3 FL (ref 78–100)
NRBC # BLD: 0 K/UL (ref 0–0.01)
NRBC BLD-RTO: 0 PER 100 WBC
PLATELET # BLD AUTO: 105 K/UL (ref 135–420)
PMV BLD AUTO: 10.5 FL (ref 9.2–11.8)
POTASSIUM SERPL-SCNC: 3.8 MMOL/L (ref 3.5–5.5)
RBC # BLD AUTO: 4.12 M/UL (ref 4.2–5.3)
SERVICE CMNT-IMP: ABNORMAL
SODIUM SERPL-SCNC: 139 MMOL/L (ref 136–145)
WBC # BLD AUTO: 10 K/UL (ref 4.6–13.2)

## 2024-11-15 PROCEDURE — 80048 BASIC METABOLIC PNL TOTAL CA: CPT

## 2024-11-15 PROCEDURE — 83735 ASSAY OF MAGNESIUM: CPT

## 2024-11-15 PROCEDURE — 6360000002 HC RX W HCPCS: Performed by: INTERNAL MEDICINE

## 2024-11-15 PROCEDURE — 36415 COLL VENOUS BLD VENIPUNCTURE: CPT

## 2024-11-15 PROCEDURE — 6370000000 HC RX 637 (ALT 250 FOR IP): Performed by: INTERNAL MEDICINE

## 2024-11-15 PROCEDURE — 2580000003 HC RX 258: Performed by: INTERNAL MEDICINE

## 2024-11-15 PROCEDURE — 85027 COMPLETE CBC AUTOMATED: CPT

## 2024-11-15 RX ORDER — LEVOFLOXACIN 500 MG/1
500 TABLET, FILM COATED ORAL DAILY
Qty: 7 TABLET | Refills: 0 | Status: SHIPPED | OUTPATIENT
Start: 2024-11-15 | End: 2024-11-22

## 2024-11-15 RX ADMIN — BACLOFEN 10 MG: 10 TABLET ORAL at 14:02

## 2024-11-15 RX ADMIN — ACETAMINOPHEN 650 MG: 325 TABLET ORAL at 10:39

## 2024-11-15 RX ADMIN — DOCUSATE SODIUM 100 MG: 100 CAPSULE, LIQUID FILLED ORAL at 02:29

## 2024-11-15 RX ADMIN — CALCIUM 500 MG: 500 TABLET ORAL at 10:45

## 2024-11-15 RX ADMIN — GABAPENTIN 300 MG: 300 CAPSULE ORAL at 10:45

## 2024-11-15 RX ADMIN — MESALAMINE 800 MG: 400 CAPSULE, DELAYED RELEASE ORAL at 14:00

## 2024-11-15 RX ADMIN — PIPERACILLIN AND TAZOBACTAM 3375 MG: 3; .375 INJECTION, POWDER, LYOPHILIZED, FOR SOLUTION INTRAVENOUS at 04:47

## 2024-11-15 RX ADMIN — CARBIDOPA AND LEVODOPA 1 TABLET: 25; 100 TABLET ORAL at 10:45

## 2024-11-15 RX ADMIN — BACLOFEN 10 MG: 10 TABLET ORAL at 10:45

## 2024-11-15 RX ADMIN — ONDANSETRON 4 MG: 2 INJECTION INTRAMUSCULAR; INTRAVENOUS at 07:50

## 2024-11-15 RX ADMIN — DOCUSATE SODIUM 100 MG: 100 CAPSULE, LIQUID FILLED ORAL at 10:45

## 2024-11-15 RX ADMIN — PIPERACILLIN AND TAZOBACTAM 3375 MG: 3; .375 INJECTION, POWDER, LYOPHILIZED, FOR SOLUTION INTRAVENOUS at 14:00

## 2024-11-15 RX ADMIN — ONDANSETRON 4 MG: 2 INJECTION INTRAMUSCULAR; INTRAVENOUS at 14:24

## 2024-11-15 RX ADMIN — GABAPENTIN 300 MG: 300 CAPSULE ORAL at 14:03

## 2024-11-15 RX ADMIN — ROPINIROLE HYDROCHLORIDE 0.25 MG: 0.25 TABLET, FILM COATED ORAL at 10:42

## 2024-11-15 RX ADMIN — DULOXETINE HYDROCHLORIDE 60 MG: 30 CAPSULE, DELAYED RELEASE ORAL at 10:45

## 2024-11-15 RX ADMIN — MESALAMINE 800 MG: 400 CAPSULE, DELAYED RELEASE ORAL at 10:44

## 2024-11-15 ASSESSMENT — PAIN SCALES - GENERAL
PAINLEVEL_OUTOF10: 6
PAINLEVEL_OUTOF10: 5

## 2024-11-15 ASSESSMENT — PAIN DESCRIPTION - ORIENTATION
ORIENTATION: RIGHT
ORIENTATION: ANTERIOR

## 2024-11-15 ASSESSMENT — PAIN DESCRIPTION - LOCATION
LOCATION: ARM
LOCATION: HEAD

## 2024-11-15 ASSESSMENT — PAIN DESCRIPTION - DESCRIPTORS
DESCRIPTORS: ACHING
DESCRIPTORS: ACHING

## 2024-11-15 NOTE — PLAN OF CARE
Problem: Pain  Goal: Verbalizes/displays adequate comfort level or baseline comfort level  11/15/2024 0344 by Kamini Quiroz RN  Outcome: Progressing  11/14/2024 1354 by Cheli Perkins RN  Outcome: Progressing     Problem: Safety - Adult  Goal: Free from fall injury  11/15/2024 0344 by Kamini Quiroz RN  Outcome: Progressing  11/14/2024 1354 by Cheli Perkins RN  Outcome: Progressing     Problem: Confusion  Goal: Confusion, delirium, dementia, or psychosis is improved or at baseline  Description: INTERVENTIONS:  1. Assess for possible contributors to thought disturbance, including medications, impaired vision or hearing, underlying metabolic abnormalities, dehydration, psychiatric diagnoses, and notify attending LIP  2. [place] high risk fall precautions, as indicated  3. Provide frequent short contacts to provide reality reorientation, refocusing and direction  4. Decrease environmental stimuli, including noise as appropriate  5. Monitor and intervene to maintain adequate nutrition, hydration, elimination, sleep and activity  6. If unable to ensure safety without constant attention obtain sitter and review sitter guidelines with assigned personnel  7. Initiate Psychosocial CNS and Spiritual Care consult, as indicated  11/15/2024 0344 by Kamini Quiroz RN  Outcome: Progressing  11/14/2024 1354 by Cheli Perkins RN  Outcome: Progressing  Flowsheets (Taken 11/14/2024 0815)  Effect of thought disturbance (confusion, delirium, dementia, or psychosis) are managed with adequate functional status: Assess for contributors to thought disturbance, including medications, impaired vision or hearing, underlying metabolic abnormalities, dehydration, psychiatric diagnoses, notify LIP     Problem: Skin/Tissue Integrity  Goal: Absence of new skin breakdown  Description: 1.  Monitor for areas of redness and/or skin breakdown  2.  Assess vascular access sites hourly  3.  Every 4-6 hours minimum:  Change oxygen saturation

## 2024-11-15 NOTE — PROGRESS NOTES
Bedside and Verbal shift change report given to Justin RN (oncoming nurse) by Judy RN (offgoing nurse). Report included the following information Nurse Handoff Report, Adult Overview, Intake/Output, MAR, Recent Results, and Med Rec Status.

## 2024-11-15 NOTE — CARE COORDINATION
CM Follow-Up    Auth approved for the pt to discharge to [facility]. Per Loreta at the [facility], they have a bed for the pt and are able to take her today. Pt has to be at the facility by 1730. Note containing medical transport info to follow.

## 2024-11-15 NOTE — PROGRESS NOTES
Bedside and Verbal shift change report given to Nestor RN and LIONEL Cheng (oncoming nurse) by LIONEL Suárez (offgoing nurse). Report included the following information Nurse Handoff Report, Adult Overview, Intake/Output, MAR, and Recent Results.

## 2024-11-15 NOTE — PROGRESS NOTES
Nurse contacted the facility [facility] to give report to receiving nurse Karissa Sun RN, verified MAR, Med Results, rapp cath , patient overview, time was given to answe questions. Patient left facility by medical transport no signs of distress at this time.

## 2024-11-15 NOTE — PROGRESS NOTES
Care  assisting Robinson Gonzalez RN Care Mgr and [facility] with Discharge Planning needs for patient.  Updates sent to [facility] (patient's preference) for review.  [facility] is currently initiating authorization for patient with insurance.      I will assist in following for further needs.

## 2024-11-15 NOTE — DISCHARGE SUMMARY
azelastine (ASTELIN) 0.1 % nasal spray 1 spray 2 times daily Takes at 0900 1600      traZODone (DESYREL) 50 MG tablet Take 1 tablet by mouth nightly Takes at 2000      amitriptyline (ELAVIL) 25 MG tablet Take 2 tablets by mouth Takes at 8pm for migraine prevention      atorvastatin (LIPITOR) 40 MG tablet ceived the following from [facility]: Outside name: atorvastatin (LIPITOR) 40 mg tablet      baclofen (LIORESAL) 10 MG tablet Take 1 tablet by mouth 3 times daily      carbidopa-levodopa (SINEMET)  MG per tablet Take 1 tablet by mouth 2 times daily Takes 0900 1600      clopidogrel (PLAVIX) 75 MG tablet 1 tab(s)      colestipol (COLESTID) 1 g tablet Take 1 tablet by mouth 2 times daily Takes 2 tabs PO for colitis      fluticasone (FLONASE) 50 MCG/ACT nasal spray 1 spray daily ceived the following from [facility]: Outside name: fluticasone propionate (FLONASE) 50 mcg/actuation nasal spray      gabapentin (NEURONTIN) 300 MG capsule Take 2 capsules by mouth 3 times daily.      pantoprazole (PROTONIX) 40 MG tablet Take 1 tablet by mouth daily      albuterol sulfate HFA (PROVENTIL;VENTOLIN;PROAIR) 108 (90 Base) MCG/ACT inhaler Inhale 1 puff into the lungs      calcium carbonate 1500 (600 Ca) MG TABS tablet calcium carbonate 600 mg calcium (1,500 mg) tablet   TAKE ONE TABLET BY MOUTH TWICE DAILY      Calcium Carbonate Antacid 1000 MG CHEW Take 2,000 mg by mouth daily Takes 0900 and 1600      melatonin 10 MG CAPS capsule Take 1 capsule by mouth      nystatin-triamcinolone (MYCOLOG II) 066731-1.1 UNIT/GM-% cream nystatin-triamcinolone 100,000 unit/g-0.1 % topical cream      denosumab (PROLIA) 60 MG/ML SOSY SC injection Inject 1 mL into the skin once      rOPINIRole (REQUIP) 0.25 MG tablet ceived the following from [facility]: Outside name: rOPINIRole (REQUIP) 0.25 mg tablet      acetaminophen (TYLENOL) 325 MG tablet Take 2 tablets by mouth daily as needed       docusate (COLACE, DULCOLAX) 100 MG CAPS 2 tab      DULoxetine (CYMBALTA) 60 MG extended release capsule Take 1 capsule by mouth daily      mesalamine (APRISO) 0.375 g extended release capsule 2 cap(s)      nystatin (MYCOSTATIN) 989772 UNIT/GM cream ceived the following from [facility]: Outside name: nystatin (MYCOSTATIN) topical cream      Rimegepant Sulfate (NURTEC) 75 MG TBDP Take 75 mg by mouth daily as needed           STOP taking these medications       furosemide (LASIX) 20 MG tablet Comments:   Reason for Stopping:         vitamin D3 (CHOLECALCIFEROL) 10 MCG (400 UNIT) TABS tablet Comments:   Reason for Stopping:         polyethylene glycol (GLYCOLAX) 17 GM/SCOOP powder Comments:   Reason for Stopping:         sodium chloride (OCEAN) 0.65 % nasal spray Comments:   Reason for Stopping:         senna-docusate (PERICOLACE) 8.6-50 MG per tablet Comments:   Reason for Stopping:         bismuth subsalicylate (PEPTO BISMOL) 262 MG/15ML suspension Comments:   Reason for Stopping:         Calcium Carbonate-Vitamin D 600-5 MG-MCG TABS Comments:   Reason for Stopping:         ferrous sulfate (IRON 325) 325 (65 Fe) MG tablet Comments:   Reason for Stopping:                Diet: low fat     Wound Care: none needed    Follow-up with Cristina Enrique APRN - NP in 1 week.    Follow-up tests/labs urology     call and make sure you have a follow up, take all discharge papers with you to your next appointment.    35 minutes were spent on the care of this patient today, on day of discharge.  Half the time were for counseling and coordination of care on the floor.    Dear patient, if you are reviewing this note and have a question regarding the medical terminology, please bring it with you to your next PCP visit.  Medical notes are meant to be a communication between medical professionals.  Additionally, portion of this note were created using voice recognition function, syntax and phonetic over may have

## 2024-11-15 NOTE — PROGRESS NOTES
Care  assisting Robinson Gonzalez RN with Discharge Planning needs for patient.  This writer initiated authorization via [employer] with [employer] for patient for [facility] (patient's preference) for placement.    Pre-cert Request   11/15/2024, 10:56 AM    Patient Name: Holly Garcia                   YOB: 1955      *ALERT - Authorization is pending review by the insurance company.  This is not an authorization.*    Submitted via [employer].  Requested Facility:  [facility]  Anticipated Admit Date to SNF:  11/15/2024  Pending Auth #:  [id number]  Pending Plan Auth ID: Unknown    CARLOS DIEZ  Case Management Department  Ph: [phone] Fax: [phone]    Will follow for further needs and discharge plans.

## 2024-11-15 NOTE — PROGRESS NOTES
Dr Joel gave verbal order to DC tele monitoring upon transfer from [facility].

## 2024-11-15 NOTE — CARE COORDINATION
CM Follow-Up    Pt will discharge to [facility] today via [facility] medical transport. Pick-up time 1600, Trip #[id number]. Number for report: [phone]

## 2024-11-15 NOTE — PROGRESS NOTES
Physical Therapy    1310: Housekeeping currently in cleaning room.  Will follow up  as schedule permits.  Pt transferring to SNF at 16:00 today.

## 2024-11-15 NOTE — PROGRESS NOTES
Care  assisting Robinson Gonzalez RN with Discharge Planning needs for patient.  Updates sent to [facility].  Will send Discharge Summary and transport time once available.    Will continue to follow.

## 2024-11-15 NOTE — PROGRESS NOTES
Medical transportation to take patient to [facility]. LIONEL Lomas to call report to receiving facility.

## 2024-11-15 NOTE — PROGRESS NOTES
Patient was not tolerating the dose of Zosyn well at  100ml. Nurse reduced dose to  45ml which was more tolerable to patient.

## 2024-11-17 LAB
BACTERIA SPEC CULT: NORMAL
BACTERIA SPEC CULT: NORMAL
SERVICE CMNT-IMP: NORMAL
SERVICE CMNT-IMP: NORMAL

## 2024-12-12 PROBLEM — N39.0 UTI (URINARY TRACT INFECTION): Status: RESOLVED | Noted: 2024-11-11 | Resolved: 2024-12-12

## 2025-04-11 ENCOUNTER — HOSPITAL ENCOUNTER (EMERGENCY)
Facility: HOSPITAL | Age: 70
Discharge: HOME | End: 2025-04-11
Payer: MEDICARE

## 2025-04-11 VITALS
HEIGHT: 66 IN | DIASTOLIC BLOOD PRESSURE: 66 MMHG | RESPIRATION RATE: 16 BRPM | HEART RATE: 100 BPM | TEMPERATURE: 97.7 F | OXYGEN SATURATION: 100 % | BODY MASS INDEX: 28.08 KG/M2 | SYSTOLIC BLOOD PRESSURE: 127 MMHG

## 2025-04-11 DIAGNOSIS — T83.021A DISPLACEMENT OF FOLEY CATHETER, INITIAL ENCOUNTER: ICD-10-CM

## 2025-04-11 DIAGNOSIS — N39.0 URINARY TRACT INFECTION ASSOCIATED WITH INDWELLING URETHRAL CATHETER, INITIAL ENCOUNTER: Primary | ICD-10-CM

## 2025-04-11 DIAGNOSIS — N31.9 NEUROGENIC BLADDER: ICD-10-CM

## 2025-04-11 DIAGNOSIS — T83.511A URINARY TRACT INFECTION ASSOCIATED WITH INDWELLING URETHRAL CATHETER, INITIAL ENCOUNTER: Primary | ICD-10-CM

## 2025-04-11 LAB
ALBUMIN SERPL-MCNC: 3.4 G/DL (ref 3.4–5)
ALBUMIN/GLOB SERPL: 1.2 (ref 0.8–1.7)
ALP SERPL-CCNC: 158 U/L (ref 45–117)
ALT SERPL-CCNC: 11 U/L (ref 13–56)
ANION GAP SERPL CALC-SCNC: 10 MMOL/L (ref 3–18)
APPEARANCE UR: ABNORMAL
AST SERPL-CCNC: 15 U/L (ref 10–38)
BACTERIA URNS QL MICRO: ABNORMAL /HPF
BASOPHILS # BLD: 0.04 K/UL (ref 0–0.1)
BASOPHILS NFR BLD: 0.4 % (ref 0–2)
BILIRUB SERPL-MCNC: 0.3 MG/DL (ref 0.2–1)
BILIRUB UR QL: NEGATIVE
BUN SERPL-MCNC: 14 MG/DL (ref 7–18)
BUN/CREAT SERPL: 12 (ref 12–20)
CALCIUM SERPL-MCNC: 9 MG/DL (ref 8.5–10.1)
CHLORIDE SERPL-SCNC: 104 MMOL/L (ref 100–111)
CO2 SERPL-SCNC: 27 MMOL/L (ref 21–32)
COLOR UR: YELLOW
CREAT SERPL-MCNC: 1.14 MG/DL (ref 0.6–1.3)
DIFFERENTIAL METHOD BLD: ABNORMAL
EOSINOPHIL # BLD: 0.37 K/UL (ref 0–0.4)
EOSINOPHIL NFR BLD: 4.1 % (ref 0–5)
EPITH CASTS URNS QL MICRO: ABNORMAL /LPF (ref 0–5)
ERYTHROCYTE [DISTWIDTH] IN BLOOD BY AUTOMATED COUNT: 17.6 % (ref 11.6–14.5)
GLOBULIN SER CALC-MCNC: 2.8 G/DL (ref 2–4)
GLUCOSE SERPL-MCNC: 104 MG/DL (ref 74–99)
GLUCOSE UR STRIP.AUTO-MCNC: NEGATIVE MG/DL
HCT VFR BLD AUTO: 28.4 % (ref 35–45)
HGB BLD-MCNC: 7.7 G/DL (ref 12–16)
HGB UR QL STRIP: ABNORMAL
IMM GRANULOCYTES # BLD AUTO: 0.02 K/UL (ref 0–0.04)
IMM GRANULOCYTES NFR BLD AUTO: 0.2 % (ref 0–0.5)
KETONES UR QL STRIP.AUTO: NEGATIVE MG/DL
LEUKOCYTE ESTERASE UR QL STRIP.AUTO: ABNORMAL
LYMPHOCYTES # BLD: 2.3 K/UL (ref 0.9–3.3)
LYMPHOCYTES NFR BLD: 25.2 % (ref 21–52)
MCH RBC QN AUTO: 19.9 PG (ref 24–34)
MCHC RBC AUTO-ENTMCNC: 27.1 G/DL (ref 31–37)
MCV RBC AUTO: 73.4 FL (ref 78–100)
MONOCYTES # BLD: 0.61 K/UL (ref 0.05–1.2)
MONOCYTES NFR BLD: 6.7 % (ref 3–10)
NEUTS SEG # BLD: 5.77 K/UL (ref 1.8–8)
NEUTS SEG NFR BLD: 63.4 % (ref 40–73)
NITRITE UR QL STRIP.AUTO: NEGATIVE
NRBC # BLD: 0 K/UL (ref 0–0.01)
NRBC BLD-RTO: 0 PER 100 WBC
PH UR STRIP: 8 (ref 5–8)
PLATELET # BLD AUTO: 427 K/UL (ref 135–420)
PMV BLD AUTO: 9.2 FL (ref 9.2–11.8)
POTASSIUM SERPL-SCNC: 4.4 MMOL/L (ref 3.5–5.5)
PROT SERPL-MCNC: 6.2 G/DL (ref 6.4–8.2)
PROT UR STRIP-MCNC: NEGATIVE MG/DL
RBC # BLD AUTO: 3.87 M/UL (ref 4.2–5.3)
RBC #/AREA URNS HPF: ABNORMAL /HPF (ref 0–5)
SODIUM SERPL-SCNC: 141 MMOL/L (ref 136–145)
SP GR UR REFRACTOMETRY: <1.005 (ref 1–1.03)
UROBILINOGEN UR QL STRIP.AUTO: 0.2 EU/DL (ref 0.2–1)
WBC # BLD AUTO: 9.1 K/UL (ref 4.6–13.2)
WBC URNS QL MICRO: ABNORMAL /HPF (ref 0–5)

## 2025-04-11 PROCEDURE — 81001 URINALYSIS AUTO W/SCOPE: CPT

## 2025-04-11 PROCEDURE — 99283 EMERGENCY DEPT VISIT LOW MDM: CPT

## 2025-04-11 PROCEDURE — 87086 URINE CULTURE/COLONY COUNT: CPT

## 2025-04-11 PROCEDURE — 80053 COMPREHEN METABOLIC PANEL: CPT

## 2025-04-11 PROCEDURE — 51798 US URINE CAPACITY MEASURE: CPT

## 2025-04-11 PROCEDURE — 85025 COMPLETE CBC W/AUTO DIFF WBC: CPT

## 2025-04-11 RX ORDER — PHENAZOPYRIDINE HYDROCHLORIDE 100 MG/1
100 TABLET, FILM COATED ORAL 3 TIMES DAILY PRN
Qty: 12 TABLET | Refills: 0 | Status: SHIPPED | OUTPATIENT
Start: 2025-04-11 | End: 2025-04-11

## 2025-04-11 RX ORDER — SULFAMETHOXAZOLE AND TRIMETHOPRIM 800; 160 MG/1; MG/1
1 TABLET ORAL 2 TIMES DAILY
Qty: 20 TABLET | Refills: 0 | Status: SHIPPED | OUTPATIENT
Start: 2025-04-11 | End: 2025-04-11

## 2025-04-11 RX ORDER — PHENAZOPYRIDINE HYDROCHLORIDE 100 MG/1
100 TABLET, FILM COATED ORAL 3 TIMES DAILY PRN
Qty: 12 TABLET | Refills: 0 | Status: SHIPPED | OUTPATIENT
Start: 2025-04-11 | End: 2025-04-15

## 2025-04-11 RX ORDER — SULFAMETHOXAZOLE AND TRIMETHOPRIM 800; 160 MG/1; MG/1
1 TABLET ORAL 2 TIMES DAILY
Qty: 20 TABLET | Refills: 0 | Status: SHIPPED | OUTPATIENT
Start: 2025-04-11 | End: 2025-04-21

## 2025-04-11 ASSESSMENT — PAIN - FUNCTIONAL ASSESSMENT: PAIN_FUNCTIONAL_ASSESSMENT: NONE - DENIES PAIN

## 2025-04-11 NOTE — DISCHARGE INSTRUCTIONS
Thank you for choosing our Emergency Department for your care.  It is our privilege to care for you in your time of need.  In the next several days, you may receive a survey via email or mailed to your home about your experience with our team.  We would greatly appreciate you taking a few minutes to complete the survey, as we use this information to learn what we have done well and what we could be doing better. Thank you for trusting us with your care!    Below you will find a list of your tests from today's visit.   Labs and Radiology Studies  Recent Results (from the past 12 hours)   CMP    Collection Time: 04/11/25 11:38 AM   Result Value Ref Range    Sodium 141 136 - 145 mmol/L    Potassium 4.4 3.5 - 5.5 mmol/L    Chloride 104 100 - 111 mmol/L    CO2 27 21 - 32 mmol/L    Anion Gap 10 3.0 - 18 mmol/L    Glucose 104 (H) 74 - 99 mg/dL    BUN 14 7.0 - 18 MG/DL    Creatinine 1.14 0.6 - 1.3 MG/DL    BUN/Creatinine Ratio 12 12 - 20      Est, Glom Filt Rate 53 (L) >60 ml/min/1.73m2    Calcium 9.0 8.5 - 10.1 MG/DL    Total Bilirubin 0.3 0.2 - 1.0 MG/DL    ALT 11 (L) 13 - 56 U/L    AST 15 10 - 38 U/L    Alk Phosphatase 158 (H) 45 - 117 U/L    Total Protein 6.2 (L) 6.4 - 8.2 g/dL    Albumin 3.4 3.4 - 5.0 g/dL    Globulin 2.8 2.0 - 4.0 g/dL    Albumin/Globulin Ratio 1.2 0.8 - 1.7     Urinalysis    Collection Time: 04/11/25 12:39 PM   Result Value Ref Range    Color, UA YELLOW      Appearance CLOUDY      Specific Gravity, UA <1.005 1.003 - 1.030    pH, Urine 8.0 5.0 - 8.0      Protein, UA Negative NEG mg/dL    Glucose, Ur Negative NEG mg/dL    Ketones, Urine Negative NEG mg/dL    Bilirubin, Urine Negative NEG      Blood, Urine TRACE (A) NEG      Urobilinogen, Urine 0.2 0.2 - 1.0 EU/dL    Nitrite, Urine Negative NEG      Leukocyte Esterase, Urine LARGE (A) NEG     Urinalysis, Micro    Collection Time: 04/11/25 12:39 PM   Result Value Ref Range    WBC, UA TOO NUMEROUS TO COUNT 0 - 5 /hpf    RBC, UA 4-10 0 - 5 /hpf     reservations about taking the medication due to side effects or interactions with other medications, please call your primary care provider or contact us directly.  Again, THANK YOU for choosing us to care for YOU!

## 2025-04-11 NOTE — ED TRIAGE NOTES
Pt ambulated to triage. C/C urinary catheter came out yesterday by accident. Pt reports home health placed it.

## 2025-04-12 LAB
BACTERIA SPEC CULT: NORMAL
CC UR VC: NORMAL
SERVICE CMNT-IMP: NORMAL

## 2025-04-19 ENCOUNTER — HOSPITAL ENCOUNTER (EMERGENCY)
Facility: HOSPITAL | Age: 70
Discharge: HOME | End: 2025-04-19
Attending: EMERGENCY MEDICINE
Payer: MEDICARE

## 2025-04-19 ENCOUNTER — APPOINTMENT (OUTPATIENT)
Facility: HOSPITAL | Age: 70
End: 2025-04-19
Payer: MEDICARE

## 2025-04-19 VITALS
TEMPERATURE: 98.5 F | BODY MASS INDEX: 28.08 KG/M2 | OXYGEN SATURATION: 100 % | DIASTOLIC BLOOD PRESSURE: 67 MMHG | RESPIRATION RATE: 18 BRPM | HEART RATE: 84 BPM | SYSTOLIC BLOOD PRESSURE: 141 MMHG | HEIGHT: 66 IN

## 2025-04-19 DIAGNOSIS — H05.232 PERIORBITAL HEMATOMA OF LEFT EYE: ICD-10-CM

## 2025-04-19 DIAGNOSIS — S09.90XA CLOSED HEAD INJURY, INITIAL ENCOUNTER: Primary | ICD-10-CM

## 2025-04-19 PROCEDURE — 70486 CT MAXILLOFACIAL W/O DYE: CPT

## 2025-04-19 PROCEDURE — 99284 EMERGENCY DEPT VISIT MOD MDM: CPT

## 2025-04-19 PROCEDURE — 6370000000 HC RX 637 (ALT 250 FOR IP): Performed by: EMERGENCY MEDICINE

## 2025-04-19 PROCEDURE — 70450 CT HEAD/BRAIN W/O DYE: CPT

## 2025-04-19 RX ORDER — BUTALBITAL, ACETAMINOPHEN AND CAFFEINE 50; 325; 40 MG/1; MG/1; MG/1
1 TABLET ORAL
Status: COMPLETED | OUTPATIENT
Start: 2025-04-19 | End: 2025-04-19

## 2025-04-19 RX ADMIN — BUTALBITAL, ACETAMINOPHEN, AND CAFFEINE 1 TABLET: 325; 50; 40 TABLET ORAL at 14:55

## 2025-04-19 NOTE — ED TRIAGE NOTES
Patient wheelchaired to ED from [facility] with son. Patient son states that she fell out of her wheelchair; takes ambien at night and that may be the cause of the fall. Fall occurred on Thursday and first stated that she did not hit her head, however patient developed left black eye and Assisted Living called son after patient admitting she did hit her head.     Patient remembers the entire event of falling; did not lose consciousness. Denies blood thinners. Was seen and evaluated by nurses at [facility].     Patient is alert and oriented, able to speak in full sentences and answer all questions appropriately.

## 2025-04-20 NOTE — ED PROVIDER NOTES
EMERGENCY DEPARTMENT HISTORY AND PHYSICAL EXAM      Date: 4/19/2025  Patient Name: Holly Garcia    History of Presenting Illness     Chief Complaint   Patient presents with    Fall       History Provided By: Patient    HPI: Holly Garcia, 69 y.o. female with PMHx as noted below presents the emergency department for evaluation of a fall.  Patient states that she experienced a mechanical fall Thursday evening hitting her head.  Subsequent developed a large black eye prompting her to come to the ED for evaluation.  Otherwise is asymptomatic at this time other than her mild headache which she states is a daily, chronic problem    Pt denies any other alleviating or exacerbating factors. Additionally, pt specifically denies any recent fever, chills,  nausea, vomiting, abdominal pain, CP, SOB, lightheadedness, dizziness, numbness, weakness, BLE swelling, heart palpitations, urinary sxs, diarrhea, constipation, melena, hematochezia, cough, or congestion.    PCP: Unknown, Provider    No current facility-administered medications for this encounter.     Current Outpatient Medications   Medication Sig Dispense Refill    sulfamethoxazole-trimethoprim (BACTRIM DS;SEPTRA DS) 800-160 MG per tablet Take 1 tablet by mouth 2 times daily for 10 days 20 tablet 0    albuterol sulfate HFA (PROVENTIL;VENTOLIN;PROAIR) 108 (90 Base) MCG/ACT inhaler Inhale 1 puff into the lungs      azelastine (ASTELIN) 0.1 % nasal spray 1 spray 2 times daily Takes at 0900 1600      calcium carbonate 1500 (600 Ca) MG TABS tablet calcium carbonate 600 mg calcium (1,500 mg) tablet   TAKE ONE TABLET BY MOUTH TWICE DAILY      Calcium Carbonate Antacid 1000 MG CHEW Take 2,000 mg by mouth daily Takes 0900 and 1600      melatonin 10 MG CAPS capsule Take 1 capsule by mouth      nystatin-triamcinolone (MYCOLOG II) 247457-9.1 UNIT/GM-% cream nystatin-triamcinolone 100,000 unit/g-0.1 % topical cream      traZODone (DESYREL) 50 MG tablet Take 1 tablet by mouth nightly Takes

## 2025-05-16 ENCOUNTER — APPOINTMENT (OUTPATIENT)
Facility: HOSPITAL | Age: 70
End: 2025-05-16
Payer: MEDICARE

## 2025-05-16 ENCOUNTER — HOSPITAL ENCOUNTER (EMERGENCY)
Facility: HOSPITAL | Age: 70
Discharge: NURSING, REHAB OR LONG-TERM CARE | End: 2025-05-16
Payer: MEDICARE

## 2025-05-16 VITALS
WEIGHT: 130.73 LBS | SYSTOLIC BLOOD PRESSURE: 128 MMHG | TEMPERATURE: 98.5 F | BODY MASS INDEX: 21.01 KG/M2 | RESPIRATION RATE: 16 BRPM | HEIGHT: 66 IN | HEART RATE: 80 BPM | OXYGEN SATURATION: 100 % | DIASTOLIC BLOOD PRESSURE: 53 MMHG

## 2025-05-16 DIAGNOSIS — T83.9XXA FOLEY CATHETER PROBLEM, INITIAL ENCOUNTER: ICD-10-CM

## 2025-05-16 DIAGNOSIS — T83.511A URINARY TRACT INFECTION ASSOCIATED WITH INDWELLING URETHRAL CATHETER, INITIAL ENCOUNTER: ICD-10-CM

## 2025-05-16 DIAGNOSIS — N31.9 NEUROGENIC BLADDER: Primary | ICD-10-CM

## 2025-05-16 DIAGNOSIS — D64.9 ANEMIA, UNSPECIFIED TYPE: ICD-10-CM

## 2025-05-16 DIAGNOSIS — N39.0 URINARY TRACT INFECTION ASSOCIATED WITH INDWELLING URETHRAL CATHETER, INITIAL ENCOUNTER: ICD-10-CM

## 2025-05-16 LAB
ALBUMIN SERPL-MCNC: 3.2 G/DL (ref 3.4–5)
ALBUMIN/GLOB SERPL: 1.3 (ref 0.8–1.7)
ALP SERPL-CCNC: 149 U/L (ref 45–117)
ALT SERPL-CCNC: 5 U/L (ref 10–35)
ANION GAP SERPL CALC-SCNC: 12 MMOL/L (ref 3–18)
APPEARANCE UR: ABNORMAL
AST SERPL-CCNC: 12 U/L (ref 10–38)
BACTERIA URNS QL MICRO: ABNORMAL /HPF
BASOPHILS # BLD: 0.01 K/UL (ref 0–0.1)
BASOPHILS NFR BLD: 0.1 % (ref 0–2)
BILIRUB SERPL-MCNC: <0.2 MG/DL (ref 0.2–1)
BILIRUB UR QL: NEGATIVE
BUN SERPL-MCNC: 25 MG/DL (ref 6–23)
BUN/CREAT SERPL: 22 (ref 12–20)
CALCIUM SERPL-MCNC: 9.6 MG/DL (ref 8.5–10.1)
CHLORIDE SERPL-SCNC: 103 MMOL/L (ref 98–107)
CO2 SERPL-SCNC: 25 MMOL/L (ref 21–32)
COLOR UR: YELLOW
CREAT SERPL-MCNC: 1.1 MG/DL (ref 0.6–1.3)
DIFFERENTIAL METHOD BLD: ABNORMAL
EOSINOPHIL # BLD: 0.27 K/UL (ref 0–0.4)
EOSINOPHIL NFR BLD: 3.5 % (ref 0–5)
EPITH CASTS URNS QL MICRO: ABNORMAL /LPF (ref 0–5)
ERYTHROCYTE [DISTWIDTH] IN BLOOD BY AUTOMATED COUNT: 23.7 % (ref 11.6–14.5)
GLOBULIN SER CALC-MCNC: 2.4 G/DL (ref 2–4)
GLUCOSE BLD STRIP.AUTO-MCNC: 104 MG/DL (ref 70–110)
GLUCOSE SERPL-MCNC: 104 MG/DL (ref 74–108)
GLUCOSE UR STRIP.AUTO-MCNC: NEGATIVE MG/DL
HCT VFR BLD AUTO: 22.3 % (ref 35–45)
HGB BLD-MCNC: 7.2 G/DL (ref 12–16)
HGB UR QL STRIP: ABNORMAL
IMM GRANULOCYTES # BLD AUTO: 0.12 K/UL (ref 0–0.04)
IMM GRANULOCYTES NFR BLD AUTO: 1.6 % (ref 0–0.5)
KETONES UR QL STRIP.AUTO: NEGATIVE MG/DL
LEUKOCYTE ESTERASE UR QL STRIP.AUTO: ABNORMAL
LYMPHOCYTES # BLD: 1.07 K/UL (ref 0.9–3.3)
LYMPHOCYTES NFR BLD: 13.9 % (ref 21–52)
MCH RBC QN AUTO: 20.1 PG (ref 24–34)
MCHC RBC AUTO-ENTMCNC: 32.3 G/DL (ref 31–37)
MCV RBC AUTO: 62.3 FL (ref 78–100)
MONOCYTES # BLD: 0.34 K/UL (ref 0.05–1.2)
MONOCYTES NFR BLD: 4.4 % (ref 3–10)
NEUTS SEG # BLD: 5.89 K/UL (ref 1.8–8)
NEUTS SEG NFR BLD: 76.5 % (ref 40–73)
NITRITE UR QL STRIP.AUTO: NEGATIVE
NRBC # BLD: 0 K/UL (ref 0–0.01)
NRBC BLD-RTO: 0 PER 100 WBC
PH UR STRIP: 8.5 (ref 5–8)
PLATELET # BLD AUTO: 374 K/UL (ref 135–420)
PMV BLD AUTO: 9.7 FL (ref 9.2–11.8)
POTASSIUM SERPL-SCNC: 4.1 MMOL/L (ref 3.5–5.5)
PROCALCITONIN SERPL-MCNC: 0.12 NG/ML
PROT SERPL-MCNC: 5.6 G/DL (ref 6.4–8.2)
PROT UR STRIP-MCNC: 100 MG/DL
RBC # BLD AUTO: 3.58 M/UL (ref 4.2–5.3)
RBC #/AREA URNS HPF: ABNORMAL /HPF (ref 0–5)
RBC MORPH BLD: ABNORMAL
SODIUM SERPL-SCNC: 140 MMOL/L (ref 136–145)
SP GR UR REFRACTOMETRY: 1.02 (ref 1–1.03)
TRI-PHOS CRY URNS QL MICRO: ABNORMAL
TROPONIN T SERPL HS-MCNC: 13.5 NG/L (ref 0–14)
TROPONIN T SERPL HS-MCNC: 20.2 NG/L (ref 0–14)
UROBILINOGEN UR QL STRIP.AUTO: 0.2 EU/DL (ref 0.2–1)
WBC # BLD AUTO: 7.7 K/UL (ref 4.6–13.2)
WBC URNS QL MICRO: ABNORMAL /HPF (ref 0–5)

## 2025-05-16 PROCEDURE — 80053 COMPREHEN METABOLIC PANEL: CPT

## 2025-05-16 PROCEDURE — 87040 BLOOD CULTURE FOR BACTERIA: CPT

## 2025-05-16 PROCEDURE — 84484 ASSAY OF TROPONIN QUANT: CPT

## 2025-05-16 PROCEDURE — 99285 EMERGENCY DEPT VISIT HI MDM: CPT

## 2025-05-16 PROCEDURE — 2580000003 HC RX 258: Performed by: PHYSICIAN ASSISTANT

## 2025-05-16 PROCEDURE — 81001 URINALYSIS AUTO W/SCOPE: CPT

## 2025-05-16 PROCEDURE — 82962 GLUCOSE BLOOD TEST: CPT

## 2025-05-16 PROCEDURE — 93005 ELECTROCARDIOGRAM TRACING: CPT | Performed by: PHYSICIAN ASSISTANT

## 2025-05-16 PROCEDURE — 87086 URINE CULTURE/COLONY COUNT: CPT

## 2025-05-16 PROCEDURE — 84145 PROCALCITONIN (PCT): CPT

## 2025-05-16 PROCEDURE — 71045 X-RAY EXAM CHEST 1 VIEW: CPT

## 2025-05-16 PROCEDURE — 96374 THER/PROPH/DIAG INJ IV PUSH: CPT

## 2025-05-16 PROCEDURE — 85025 COMPLETE CBC W/AUTO DIFF WBC: CPT

## 2025-05-16 PROCEDURE — 2500000003 HC RX 250 WO HCPCS: Performed by: PHYSICIAN ASSISTANT

## 2025-05-16 PROCEDURE — 6360000002 HC RX W HCPCS: Performed by: PHYSICIAN ASSISTANT

## 2025-05-16 RX ORDER — 0.9 % SODIUM CHLORIDE 0.9 %
30 INTRAVENOUS SOLUTION INTRAVENOUS ONCE
Status: COMPLETED | OUTPATIENT
Start: 2025-05-16 | End: 2025-05-16

## 2025-05-16 RX ORDER — CEPHALEXIN 500 MG/1
500 CAPSULE ORAL 2 TIMES DAILY
Qty: 14 CAPSULE | Refills: 0 | Status: SHIPPED | OUTPATIENT
Start: 2025-05-16 | End: 2025-05-23

## 2025-05-16 RX ADMIN — SODIUM CHLORIDE 1779 ML: 0.9 INJECTION, SOLUTION INTRAVENOUS at 21:18

## 2025-05-16 RX ADMIN — WATER 1000 MG: 1 INJECTION INTRAMUSCULAR; INTRAVENOUS; SUBCUTANEOUS at 20:46

## 2025-05-16 NOTE — ED TRIAGE NOTES
Patient wheelchaired to the ED c/o needs rapp catheter inserted. Patient states that normally lives in [facility], and just moved to [facility] 1 year ago but has not followed up with urology. Patient has hx of MS and has neurogenic bladder, however most recent rapp catheter was leaking and was not draining properly so she self removed her rapp, now needs a new one.     States she needs a 20Fr.

## 2025-05-16 NOTE — ED PROVIDER NOTES
[facility]  EMERGENCY DEPARTMENT ENCOUNTER       Pt Name: Holly Garcia  MRN: [id number]  Birthdate 1955  Date of evaluation: 5/16/2025  PCP: Unknown, Provider  Note Started: 11:19 PM 5/16/25     CHIEF COMPLAINT       Chief Complaint   Patient presents with    Urinary Catheter        HISTORY OF PRESENT ILLNESS: 1 or more elements      History From: Patient  HPI Limitations: None  Chronic Conditions: Wheelchair-bound secondary to MS  Social Determinants affecting Dx or Tx: Resides at [facility] living facility    Holly Garcia is a 69 y.o. female who presents to ED c/o leaking from her Lizarraga catheter yesterday so she removed it herself and states she has just been leaking urine today.  Patient has chronic indwelling Lizarraga catheter that is usually changed monthly at her assisted living facility due to neurogenic bladder from MS.  She feels well otherwise, denies fever, abdominal pain, back pain, fever, nausea, vomiting, change in urine odor and any other symptoms or complaints.  Patient that he is     Nursing Notes were all reviewed and agreed with or any disagreements were addressed in the HPI.    PAST HISTORY     Past Medical History:  Past Medical History:   Diagnosis Date    MANDI (acute kidney injury) 11/12/2024    Anemia     Constipation     Headache     Leg pain     Multiple sclerosis (HCC)     Murmur     Muscle atrophy     Restless leg 3/12/2016       Past Surgical History:  Past Surgical History:   Procedure Laterality Date    COLONOSCOPY N/A 2/16/2022    DIAGNOSTIC COLONOSCOPY to Mid-transverse Colon performed by Merrill Martinez MD at [facility] ENDOSCOPY    COLONOSCOPY N/A 10/25/2018    COLONOSCOPY, DIAGNOSTIC with bx performed by Merrill Martinez MD at [facility] ENDOSCOPY    CYSTOSCOPY Right 11/12/2024    CYSTOSCOPY RETROGRADE PYELOGRAM Stent placement performed by Conchis Mendez MD at [facility] MAIN OR    HYSTERECTOMY (CERVIX STATUS UNKNOWN)      ORTHOPEDIC SURGERY Right      [facility] [address]      Phone: [phone]   cephALEXin 500 MG capsule                I am the Primary Clinician of Record.       (Please note that parts of this dictation were completed with voice recognition software. Quite often unanticipated grammatical, syntax, homophones, and other interpretive errors are inadvertently transcribed by the computer software. Please disregards these errors. Please excuse any errors that have escaped final proofreading.)       Angela Miller PA  05/16/25 [id number]

## 2025-05-17 NOTE — FLOWSHEET NOTE
05/16/25 2115   Vital Signs   Pulse 80   BP (!) 128/53   MAP (Calculated) 78     IV fluids infusing.  16F rapp catheter inserted.Urine sample sent to lab  
ELI Moreno at bedside/Supplemental Oxygen (see orders)

## 2025-05-18 LAB
BACTERIA SPEC CULT: NORMAL
BACTERIA SPEC CULT: NORMAL
EKG ATRIAL RATE: 78 BPM
EKG ATRIAL RATE: 84 BPM
EKG DIAGNOSIS: NORMAL
EKG DIAGNOSIS: NORMAL
EKG P AXIS: 34 DEGREES
EKG P AXIS: 60 DEGREES
EKG P-R INTERVAL: 148 MS
EKG P-R INTERVAL: 166 MS
EKG Q-T INTERVAL: 398 MS
EKG Q-T INTERVAL: 402 MS
EKG QRS DURATION: 88 MS
EKG QRS DURATION: 96 MS
EKG QTC CALCULATION (BAZETT): 458 MS
EKG QTC CALCULATION (BAZETT): 467 MS
EKG R AXIS: 13 DEGREES
EKG R AXIS: 2 DEGREES
EKG T AXIS: 21 DEGREES
EKG T AXIS: 46 DEGREES
EKG VENTRICULAR RATE: 78 BPM
EKG VENTRICULAR RATE: 83 BPM
SERVICE CMNT-IMP: NORMAL
SERVICE CMNT-IMP: NORMAL

## 2025-05-19 LAB
BACTERIA SPEC CULT: NORMAL
CC UR VC: NORMAL
SERVICE CMNT-IMP: NORMAL

## 2025-05-31 ENCOUNTER — APPOINTMENT (OUTPATIENT)
Facility: HOSPITAL | Age: 70
End: 2025-05-31
Payer: MEDICARE

## 2025-05-31 ENCOUNTER — HOSPITAL ENCOUNTER (EMERGENCY)
Facility: HOSPITAL | Age: 70
Discharge: HOME | End: 2025-05-31
Attending: EMERGENCY MEDICINE
Payer: MEDICARE

## 2025-05-31 VITALS
HEART RATE: 69 BPM | DIASTOLIC BLOOD PRESSURE: 61 MMHG | TEMPERATURE: 97.7 F | HEIGHT: 66 IN | RESPIRATION RATE: 16 BRPM | SYSTOLIC BLOOD PRESSURE: 134 MMHG | WEIGHT: 150 LBS | OXYGEN SATURATION: 100 % | BODY MASS INDEX: 24.11 KG/M2

## 2025-05-31 DIAGNOSIS — N39.0 URINARY TRACT INFECTION WITHOUT HEMATURIA, SITE UNSPECIFIED: Primary | ICD-10-CM

## 2025-05-31 DIAGNOSIS — Z96.0 URETERAL STENT PRESENT: ICD-10-CM

## 2025-05-31 LAB
ANION GAP SERPL CALC-SCNC: 14 MMOL/L (ref 3–18)
APPEARANCE UR: ABNORMAL
BACTERIA URNS QL MICRO: ABNORMAL /HPF
BASOPHILS # BLD: 0.04 K/UL (ref 0–0.1)
BASOPHILS NFR BLD: 0.5 % (ref 0–2)
BILIRUB UR QL: NEGATIVE
BUN SERPL-MCNC: 18 MG/DL (ref 6–23)
BUN/CREAT SERPL: 15 (ref 12–20)
CALCIUM SERPL-MCNC: 9.9 MG/DL (ref 8.5–10.1)
CHLORIDE SERPL-SCNC: 103 MMOL/L (ref 98–107)
CO2 SERPL-SCNC: 26 MMOL/L (ref 21–32)
COLOR UR: YELLOW
CREAT SERPL-MCNC: 1.2 MG/DL (ref 0.6–1.3)
DIFFERENTIAL METHOD BLD: ABNORMAL
EOSINOPHIL # BLD: 0.33 K/UL (ref 0–0.4)
EOSINOPHIL NFR BLD: 4.1 % (ref 0–5)
EPITH CASTS URNS QL MICRO: ABNORMAL /LPF (ref 0–5)
ERYTHROCYTE [DISTWIDTH] IN BLOOD BY AUTOMATED COUNT: ABNORMAL % (ref 11.6–14.5)
GLUCOSE SERPL-MCNC: 98 MG/DL (ref 74–108)
GLUCOSE UR STRIP.AUTO-MCNC: NEGATIVE MG/DL
HCT VFR BLD AUTO: 33.3 % (ref 35–45)
HGB BLD-MCNC: 9.3 G/DL (ref 12–16)
HGB UR QL STRIP: ABNORMAL
IMM GRANULOCYTES # BLD AUTO: 0.02 K/UL (ref 0–0.04)
IMM GRANULOCYTES NFR BLD AUTO: 0.3 % (ref 0–0.5)
KETONES UR QL STRIP.AUTO: NEGATIVE MG/DL
LEUKOCYTE ESTERASE UR QL STRIP.AUTO: ABNORMAL
LYMPHOCYTES # BLD: 1.9 K/UL (ref 0.9–3.3)
LYMPHOCYTES NFR BLD: 23.8 % (ref 21–52)
MCH RBC QN AUTO: 24.1 PG (ref 24–34)
MCHC RBC AUTO-ENTMCNC: 27.9 G/DL (ref 31–37)
MCV RBC AUTO: 86.3 FL (ref 78–100)
MONOCYTES # BLD: 0.64 K/UL (ref 0.05–1.2)
MONOCYTES NFR BLD: 8 % (ref 3–10)
NEUTS SEG # BLD: 5.07 K/UL (ref 1.8–8)
NEUTS SEG NFR BLD: 63.3 % (ref 40–73)
NITRITE UR QL STRIP.AUTO: POSITIVE
NRBC # BLD: 0 K/UL (ref 0–0.01)
NRBC BLD-RTO: 0 PER 100 WBC
PH UR STRIP: 7 (ref 5–8)
PLATELET # BLD AUTO: 321 K/UL (ref 135–420)
PLATELET COMMENT: ABNORMAL
PMV BLD AUTO: 9.5 FL (ref 9.2–11.8)
POTASSIUM SERPL-SCNC: 4.3 MMOL/L (ref 3.5–5.5)
PROT UR STRIP-MCNC: 300 MG/DL
RBC # BLD AUTO: 3.86 M/UL (ref 4.2–5.3)
RBC #/AREA URNS HPF: ABNORMAL /HPF (ref 0–5)
RBC MORPH BLD: ABNORMAL
SODIUM SERPL-SCNC: 142 MMOL/L (ref 136–145)
SP GR UR REFRACTOMETRY: 1.01 (ref 1–1.03)
UROBILINOGEN UR QL STRIP.AUTO: 0.2 EU/DL (ref 0.2–1)
WBC # BLD AUTO: 8 K/UL (ref 4.6–13.2)
WBC URNS QL MICRO: ABNORMAL /HPF (ref 0–5)

## 2025-05-31 PROCEDURE — 96374 THER/PROPH/DIAG INJ IV PUSH: CPT

## 2025-05-31 PROCEDURE — 80048 BASIC METABOLIC PNL TOTAL CA: CPT

## 2025-05-31 PROCEDURE — 87086 URINE CULTURE/COLONY COUNT: CPT

## 2025-05-31 PROCEDURE — 2500000003 HC RX 250 WO HCPCS: Performed by: EMERGENCY MEDICINE

## 2025-05-31 PROCEDURE — 74176 CT ABD & PELVIS W/O CONTRAST: CPT

## 2025-05-31 PROCEDURE — 99284 EMERGENCY DEPT VISIT MOD MDM: CPT

## 2025-05-31 PROCEDURE — 85025 COMPLETE CBC W/AUTO DIFF WBC: CPT

## 2025-05-31 PROCEDURE — 6360000002 HC RX W HCPCS: Performed by: EMERGENCY MEDICINE

## 2025-05-31 PROCEDURE — 81001 URINALYSIS AUTO W/SCOPE: CPT

## 2025-05-31 PROCEDURE — 87040 BLOOD CULTURE FOR BACTERIA: CPT

## 2025-05-31 RX ORDER — CEPHALEXIN 500 MG/1
500 CAPSULE ORAL 4 TIMES DAILY
Qty: 28 CAPSULE | Refills: 0 | Status: SHIPPED | OUTPATIENT
Start: 2025-05-31 | End: 2025-06-07

## 2025-05-31 RX ADMIN — WATER 1000 MG: 1 INJECTION INTRAMUSCULAR; INTRAVENOUS; SUBCUTANEOUS at 10:47

## 2025-05-31 ASSESSMENT — PAIN SCALES - GENERAL: PAINLEVEL_OUTOF10: 7

## 2025-05-31 ASSESSMENT — LIFESTYLE VARIABLES
HOW MANY STANDARD DRINKS CONTAINING ALCOHOL DO YOU HAVE ON A TYPICAL DAY: PATIENT DOES NOT DRINK
HOW OFTEN DO YOU HAVE A DRINK CONTAINING ALCOHOL: NEVER

## 2025-05-31 ASSESSMENT — PAIN - FUNCTIONAL ASSESSMENT: PAIN_FUNCTIONAL_ASSESSMENT: 0-10

## 2025-05-31 ASSESSMENT — PAIN DESCRIPTION - LOCATION: LOCATION: VAGINA

## 2025-05-31 NOTE — ED TRIAGE NOTES
Pt alert and conversational. Arrived via EMS from [facility]. C/O rapp leaking. Pt states, \"the bulb is right there making me hurt.\"    Active Ambulatory Problems     Diagnosis Date Noted    Neurogenic bladder 03/12/2016    Restless leg 03/12/2016    MS (multiple sclerosis) ([facility]) 03/11/2016    Toxic metabolic encephalopathy 11/11/2024    Urinary tract infection associated with catheterization of urinary tract, initial encounter 11/12/2024    MANDI (acute kidney injury) 11/12/2024    Sepsis ([facility]) 11/12/2024    Uropathy, obstructive 11/13/2024    Hypotension 11/13/2024     Resolved Ambulatory Problems     Diagnosis Date Noted    UTI, complicated/cath-associated 11/11/2024     Past Medical History:   Diagnosis Date    Anemia     Constipation     Headache     Leg pain     Multiple sclerosis ([facility])     Murmur     Muscle atrophy

## 2025-05-31 NOTE — FLOWSHEET NOTE
05/31/25 0851   Genitourinary   Genitourinary (WDL) X   Urinary Status Rapp     Pt states rapp is leaking. There was no bag on rapp on arrival to ED.

## 2025-05-31 NOTE — DISCHARGE INSTRUCTIONS
Thank you for visiting the emergency department today.  As we discussed you have signs of urinary tract infection.  I will prescribe you antibiotics.  You need to call your urologist first thing Monday morning to schedule a close follow-up appointment. Dr. Farmer's always will make sure you get a quick appointment.  If you start feeling worse or develop a fever you need to come back to the Emergency Department for evaluation.

## 2025-05-31 NOTE — ED PROVIDER NOTES
[facility]  EMERGENCY DEPARTMENT ENCOUNTER    Patient Name: Holly Garcia  MRN: [id number]  YOB: 1955  Provider: BUBBA Rosales MD am the primary clinician of record.  Time/Date of evaluation: 8:27 AM EDT on 5/31/25    History of Presenting Illness     History provided by: Patient  History is limited by: Nothing   Evaluated in room: 11    Chief Complaint: Urinary catheter leaking.    HISTORY:  Holly Garcia is a 69 y.o. female dents with a chief complaint of urinary catheter leaking.  She lives at [facility].  She has a Lizarraga catheter in place since 5/16.  She has urinary retention secondary to MS.  Denies any fevers or chills.  No abdominal pain nausea or vomiting.  She does say that she possibly has a urinary stent that has been left in place for about 6 months and is removed    Nursing Notes were all reviewed and agreed with or any disagreements were addressed in the HPI.    Past History     PAST MEDICAL HISTORY:  Past Medical History:   Diagnosis Date   • MANDI (acute kidney injury) 11/12/2024   • Anemia    • Constipation    • Headache    • Leg pain    • Multiple sclerosis (HCC)    • Murmur    • Muscle atrophy    • Restless leg 3/12/2016       PAST SURGICAL HISTORY:  Past Surgical History:   Procedure Laterality Date   • COLONOSCOPY N/A 2/16/2022    DIAGNOSTIC COLONOSCOPY to Mid-transverse Colon performed by Merrill Martinez MD at [facility] ENDOSCOPY   • COLONOSCOPY N/A 10/25/2018    COLONOSCOPY, DIAGNOSTIC with bx performed by Merrill Martinez MD at [facility] ENDOSCOPY   • CYSTOSCOPY Right 11/12/2024    CYSTOSCOPY RETROGRADE PYELOGRAM Stent placement performed by Conchis Mendez MD at [facility] MAIN OR   • HYSTERECTOMY (CERVIX STATUS UNKNOWN)     • ORTHOPEDIC SURGERY Right     tendon surgery to wrist   • TONSILLECTOMY         FAMILY HISTORY:  Family History   Problem Relation Age of Onset   • Cancer Father    • Stroke Father    • Heart Attack Father    • High Cholesterol  Comment OVALOCYTES  1+        RBC Comment        TEARDROP CELLS  FEW  SCHISTOCYTES  FEW  NEDRA CELLS  FEW         RADIOLOGIC STUDIES:   Non x-ray images such as CT, Ultrasound and MRI are read by the radiologist. X-ray images are visualized and preliminarily interpreted by the ED Provider with the findings as listed in the ED Course section below.     Interpretation per the Radiologist is listed below, if available at the time of this note:    CT ABDOMEN PELVIS WO CONTRAST Additional Contrast? None   Final Result   1. Right ureteral stent in place. Right hydronephrosis and ureterectasis in its   spite.   2. Suspect right pyelitis and ureteritis; this could also be chronic   inflammation.   3. Redundant length of Lizarraga catheter looped in the bladder.      22Q            Electronically signed by Kobe Kaiser          Procedures     Procedures    ED Course and Medical Decision Making     8:27 AM EDT I (Viktor Rosales MD) am the first provider for this patient. Initial assessment performed. I reviewed the vital signs, available nursing notes, past medical history, past surgical history, family history and social history. The patients presenting problems have been discussed, and they are in agreement with the care plan formulated and outlined with them. I have encouraged them to ask questions as they arise throughout their visit.    RECORDS REVIEWED: Nursing Notes and Old Medical Records    Arrival: Resting comfortably, no acute distress.  Catheter was found to be in place and actively draining however no collection bag was attached.    ED Course as of 05/31/25 1921   Sat May 31, 2025   1015 Consult Note:  Time: 10:15 AM EDT   Physician: Dr. Ulloa  Specialty: Urology  Discussion: I discussed the patient's presentation, vitals, labs and imaging if available.  Recommends admission and treatment for pyelonephritis if her white blood cell count is elevated.  If it is normal recommends discharge home and she will

## 2025-05-31 NOTE — ED NOTES
Pt alert and conversational. Requesting new rapp. New rapp placed with Elías FLOWERS. Pt tolerated well.

## 2025-06-03 LAB
BACTERIA SPEC CULT: NORMAL
CC UR VC: NORMAL
SERVICE CMNT-IMP: NORMAL

## 2025-08-07 ENCOUNTER — HOSPITAL ENCOUNTER (INPATIENT)
Facility: HOSPITAL | Age: 70
LOS: 5 days | Discharge: HOME | DRG: 698 | End: 2025-08-12
Attending: HOSPITALIST | Admitting: HOSPITALIST
Payer: MEDICARE

## 2025-08-07 ENCOUNTER — APPOINTMENT (OUTPATIENT)
Facility: HOSPITAL | Age: 70
DRG: 698 | End: 2025-08-07
Payer: MEDICARE

## 2025-08-07 DIAGNOSIS — D72.829 LEUKOCYTOSIS, UNSPECIFIED TYPE: ICD-10-CM

## 2025-08-07 DIAGNOSIS — N30.01 ACUTE CYSTITIS WITH HEMATURIA: Primary | ICD-10-CM

## 2025-08-07 DIAGNOSIS — R79.89 ELEVATED TROPONIN: ICD-10-CM

## 2025-08-07 PROBLEM — Z96.0 URETERAL STENT PRESENT: Status: ACTIVE | Noted: 2025-08-07

## 2025-08-07 PROBLEM — N13.30 BILATERAL HYDRONEPHROSIS: Status: ACTIVE | Noted: 2025-08-07

## 2025-08-07 PROBLEM — N17.9 AKI (ACUTE KIDNEY INJURY): Status: ACTIVE | Noted: 2025-08-07

## 2025-08-07 PROBLEM — N17.9 AKI (ACUTE KIDNEY INJURY): Status: RESOLVED | Noted: 2024-11-12 | Resolved: 2025-08-07

## 2025-08-07 LAB
ALBUMIN SERPL-MCNC: 3.1 G/DL (ref 3.4–5)
ALBUMIN/GLOB SERPL: 1 (ref 0.8–1.7)
ALP SERPL-CCNC: 151 U/L (ref 45–117)
ALT SERPL-CCNC: 8 U/L (ref 10–35)
ANION GAP SERPL CALC-SCNC: 18 MMOL/L (ref 3–18)
APPEARANCE UR: ABNORMAL
AST SERPL-CCNC: 26 U/L (ref 10–38)
BACTERIA URNS QL MICRO: ABNORMAL /HPF
BASOPHILS # BLD: 0.05 K/UL (ref 0–0.1)
BASOPHILS NFR BLD: 0.3 % (ref 0–2)
BILIRUB SERPL-MCNC: 0.2 MG/DL (ref 0.2–1)
BILIRUB UR QL: NEGATIVE
BUN SERPL-MCNC: 17 MG/DL (ref 6–23)
BUN/CREAT SERPL: 13 (ref 12–20)
CALCIUM SERPL-MCNC: 8.8 MG/DL (ref 8.5–10.1)
CHLORIDE SERPL-SCNC: 96 MMOL/L (ref 98–107)
CO2 SERPL-SCNC: 21 MMOL/L (ref 21–32)
COLOR UR: YELLOW
CREAT SERPL-MCNC: 1.37 MG/DL (ref 0.6–1.3)
DIFFERENTIAL METHOD BLD: ABNORMAL
EOSINOPHIL # BLD: 0.05 K/UL (ref 0–0.4)
EOSINOPHIL NFR BLD: 0.3 % (ref 0–5)
EPITH CASTS URNS QL MICRO: ABNORMAL /LPF (ref 0–5)
ERYTHROCYTE [DISTWIDTH] IN BLOOD BY AUTOMATED COUNT: 14.3 % (ref 11.6–14.5)
GLOBULIN SER CALC-MCNC: 3.1 G/DL (ref 2–4)
GLUCOSE SERPL-MCNC: 131 MG/DL (ref 74–108)
GLUCOSE UR STRIP.AUTO-MCNC: NEGATIVE MG/DL
HCT VFR BLD AUTO: 34 % (ref 35–45)
HGB BLD-MCNC: 11.2 G/DL (ref 12–16)
HGB UR QL STRIP: ABNORMAL
IMM GRANULOCYTES # BLD AUTO: 0.08 K/UL (ref 0–0.04)
IMM GRANULOCYTES NFR BLD AUTO: 0.5 % (ref 0–0.5)
KETONES UR QL STRIP.AUTO: 15 MG/DL
LACTATE BLD-SCNC: 0.76 MMOL/L (ref 0.4–2)
LEUKOCYTE ESTERASE UR QL STRIP.AUTO: ABNORMAL
LYMPHOCYTES # BLD: 1.2 K/UL (ref 0.9–3.3)
LYMPHOCYTES NFR BLD: 7.8 % (ref 21–52)
MCH RBC QN AUTO: 27.7 PG (ref 24–34)
MCHC RBC AUTO-ENTMCNC: 32.9 G/DL (ref 31–37)
MCV RBC AUTO: 84 FL (ref 78–100)
MONOCYTES # BLD: 1.54 K/UL (ref 0.05–1.2)
MONOCYTES NFR BLD: 10 % (ref 3–10)
NEUTS SEG # BLD: 12.49 K/UL (ref 1.8–8)
NEUTS SEG NFR BLD: 81.1 % (ref 40–73)
NITRITE UR QL STRIP.AUTO: POSITIVE
NRBC # BLD: 0 K/UL (ref 0–0.01)
NRBC BLD-RTO: 0 PER 100 WBC
NT PRO BNP: 458 PG/ML (ref 36–900)
PH UR STRIP: 8.5 (ref 5–8)
PLATELET # BLD AUTO: 248 K/UL (ref 135–420)
PMV BLD AUTO: 9.2 FL (ref 9.2–11.8)
POTASSIUM SERPL-SCNC: 4.2 MMOL/L (ref 3.5–5.5)
PROCALCITONIN SERPL-MCNC: 0.83 NG/ML
PROCALCITONIN SERPL-MCNC: 0.85 NG/ML
PROT SERPL-MCNC: 6.1 G/DL (ref 6.4–8.2)
PROT UR STRIP-MCNC: 300 MG/DL
RBC # BLD AUTO: 4.05 M/UL (ref 4.2–5.3)
RBC #/AREA URNS HPF: ABNORMAL /HPF (ref 0–5)
RBC MORPH BLD: ABNORMAL
SODIUM SERPL-SCNC: 134 MMOL/L (ref 136–145)
SP GR UR REFRACTOMETRY: 1.01 (ref 1–1.03)
TROPONIN T SERPL HS-MCNC: 29.6 NG/L (ref 0–14)
TROPONIN T SERPL HS-MCNC: 30.8 NG/L (ref 0–14)
UROBILINOGEN UR QL STRIP.AUTO: 0.2 EU/DL (ref 0.2–1)
WBC # BLD AUTO: 15.4 K/UL (ref 4.6–13.2)
WBC URNS QL MICRO: ABNORMAL /HPF (ref 0–5)

## 2025-08-07 PROCEDURE — 87186 SC STD MICRODIL/AGAR DIL: CPT

## 2025-08-07 PROCEDURE — 2500000003 HC RX 250 WO HCPCS

## 2025-08-07 PROCEDURE — 87086 URINE CULTURE/COLONY COUNT: CPT

## 2025-08-07 PROCEDURE — 84484 ASSAY OF TROPONIN QUANT: CPT

## 2025-08-07 PROCEDURE — 2580000003 HC RX 258: Performed by: NURSE PRACTITIONER

## 2025-08-07 PROCEDURE — 87088 URINE BACTERIA CULTURE: CPT

## 2025-08-07 PROCEDURE — 81001 URINALYSIS AUTO W/SCOPE: CPT

## 2025-08-07 PROCEDURE — 87077 CULTURE AEROBIC IDENTIFY: CPT

## 2025-08-07 PROCEDURE — 6370000000 HC RX 637 (ALT 250 FOR IP)

## 2025-08-07 PROCEDURE — 71045 X-RAY EXAM CHEST 1 VIEW: CPT

## 2025-08-07 PROCEDURE — 80053 COMPREHEN METABOLIC PANEL: CPT

## 2025-08-07 PROCEDURE — 87040 BLOOD CULTURE FOR BACTERIA: CPT

## 2025-08-07 PROCEDURE — 6360000002 HC RX W HCPCS

## 2025-08-07 PROCEDURE — 83880 ASSAY OF NATRIURETIC PEPTIDE: CPT

## 2025-08-07 PROCEDURE — 85025 COMPLETE CBC W/AUTO DIFF WBC: CPT

## 2025-08-07 PROCEDURE — 1100000003 HC PRIVATE W/ TELEMETRY

## 2025-08-07 PROCEDURE — 6370000000 HC RX 637 (ALT 250 FOR IP): Performed by: NURSE PRACTITIONER

## 2025-08-07 PROCEDURE — 2580000003 HC RX 258

## 2025-08-07 PROCEDURE — 2500000003 HC RX 250 WO HCPCS: Performed by: NURSE PRACTITIONER

## 2025-08-07 PROCEDURE — 99285 EMERGENCY DEPT VISIT HI MDM: CPT

## 2025-08-07 PROCEDURE — 93005 ELECTROCARDIOGRAM TRACING: CPT

## 2025-08-07 PROCEDURE — 96375 TX/PRO/DX INJ NEW DRUG ADDON: CPT

## 2025-08-07 PROCEDURE — 84145 PROCALCITONIN (PCT): CPT

## 2025-08-07 PROCEDURE — 6360000004 HC RX CONTRAST MEDICATION

## 2025-08-07 PROCEDURE — 74177 CT ABD & PELVIS W/CONTRAST: CPT

## 2025-08-07 PROCEDURE — 83605 ASSAY OF LACTIC ACID: CPT

## 2025-08-07 PROCEDURE — 36415 COLL VENOUS BLD VENIPUNCTURE: CPT

## 2025-08-07 PROCEDURE — 96374 THER/PROPH/DIAG INJ IV PUSH: CPT

## 2025-08-07 PROCEDURE — 87154 CUL TYP ID BLD PTHGN 6+ TRGT: CPT

## 2025-08-07 RX ORDER — BACLOFEN 10 MG/1
10 TABLET ORAL 3 TIMES DAILY
Status: DISCONTINUED | OUTPATIENT
Start: 2025-08-07 | End: 2025-08-12 | Stop reason: HOSPADM

## 2025-08-07 RX ORDER — SODIUM CHLORIDE, SODIUM LACTATE, POTASSIUM CHLORIDE, AND CALCIUM CHLORIDE .6; .31; .03; .02 G/100ML; G/100ML; G/100ML; G/100ML
1000 INJECTION, SOLUTION INTRAVENOUS ONCE
Status: COMPLETED | OUTPATIENT
Start: 2025-08-07 | End: 2025-08-07

## 2025-08-07 RX ORDER — POTASSIUM CHLORIDE 1500 MG/1
40 TABLET, EXTENDED RELEASE ORAL PRN
Status: DISCONTINUED | OUTPATIENT
Start: 2025-08-07 | End: 2025-08-12 | Stop reason: HOSPADM

## 2025-08-07 RX ORDER — IOPAMIDOL 612 MG/ML
100 INJECTION, SOLUTION INTRAVASCULAR
Status: COMPLETED | OUTPATIENT
Start: 2025-08-07 | End: 2025-08-07

## 2025-08-07 RX ORDER — CARBIDOPA AND LEVODOPA 25; 100 MG/1; MG/1
1 TABLET ORAL 2 TIMES DAILY
Status: DISCONTINUED | OUTPATIENT
Start: 2025-08-08 | End: 2025-08-12 | Stop reason: HOSPADM

## 2025-08-07 RX ORDER — SODIUM CHLORIDE 9 MG/ML
INJECTION, SOLUTION INTRAVENOUS PRN
Status: DISCONTINUED | OUTPATIENT
Start: 2025-08-07 | End: 2025-08-12 | Stop reason: HOSPADM

## 2025-08-07 RX ORDER — GABAPENTIN 400 MG/1
400 CAPSULE ORAL 2 TIMES DAILY
Status: DISCONTINUED | OUTPATIENT
Start: 2025-08-07 | End: 2025-08-12 | Stop reason: HOSPADM

## 2025-08-07 RX ORDER — ONDANSETRON 4 MG/1
4 TABLET, ORALLY DISINTEGRATING ORAL EVERY 8 HOURS PRN
Status: DISCONTINUED | OUTPATIENT
Start: 2025-08-07 | End: 2025-08-12 | Stop reason: HOSPADM

## 2025-08-07 RX ORDER — BUTALBITAL, ACETAMINOPHEN AND CAFFEINE 50; 325; 40 MG/1; MG/1; MG/1
1 TABLET ORAL
Status: COMPLETED | OUTPATIENT
Start: 2025-08-07 | End: 2025-08-07

## 2025-08-07 RX ORDER — SODIUM CHLORIDE 0.9 % (FLUSH) 0.9 %
5-40 SYRINGE (ML) INJECTION PRN
Status: DISCONTINUED | OUTPATIENT
Start: 2025-08-07 | End: 2025-08-12 | Stop reason: HOSPADM

## 2025-08-07 RX ORDER — ONDANSETRON 2 MG/ML
4 INJECTION INTRAMUSCULAR; INTRAVENOUS EVERY 6 HOURS PRN
Status: DISCONTINUED | OUTPATIENT
Start: 2025-08-07 | End: 2025-08-12 | Stop reason: HOSPADM

## 2025-08-07 RX ORDER — ACETAMINOPHEN 650 MG/1
650 SUPPOSITORY RECTAL EVERY 6 HOURS PRN
Status: DISCONTINUED | OUTPATIENT
Start: 2025-08-07 | End: 2025-08-12 | Stop reason: HOSPADM

## 2025-08-07 RX ORDER — CLOPIDOGREL BISULFATE 75 MG/1
75 TABLET ORAL DAILY
Status: DISCONTINUED | OUTPATIENT
Start: 2025-08-08 | End: 2025-08-12 | Stop reason: HOSPADM

## 2025-08-07 RX ORDER — POLYETHYLENE GLYCOL 3350 17 G/17G
17 POWDER, FOR SOLUTION ORAL DAILY PRN
Status: DISCONTINUED | OUTPATIENT
Start: 2025-08-07 | End: 2025-08-12 | Stop reason: HOSPADM

## 2025-08-07 RX ORDER — HEPARIN SODIUM 5000 [USP'U]/ML
5000 INJECTION, SOLUTION INTRAVENOUS; SUBCUTANEOUS EVERY 8 HOURS SCHEDULED
Status: DISCONTINUED | OUTPATIENT
Start: 2025-08-07 | End: 2025-08-12 | Stop reason: HOSPADM

## 2025-08-07 RX ORDER — ATORVASTATIN CALCIUM 20 MG/1
20 TABLET, FILM COATED ORAL NIGHTLY
Status: DISCONTINUED | OUTPATIENT
Start: 2025-08-07 | End: 2025-08-12 | Stop reason: HOSPADM

## 2025-08-07 RX ORDER — AZELASTINE 1 MG/ML
1 SPRAY, METERED NASAL 2 TIMES DAILY
Status: DISCONTINUED | OUTPATIENT
Start: 2025-08-07 | End: 2025-08-12 | Stop reason: HOSPADM

## 2025-08-07 RX ORDER — POTASSIUM CHLORIDE 7.45 MG/ML
10 INJECTION INTRAVENOUS PRN
Status: DISCONTINUED | OUTPATIENT
Start: 2025-08-07 | End: 2025-08-12 | Stop reason: HOSPADM

## 2025-08-07 RX ORDER — SODIUM CHLORIDE 0.9 % (FLUSH) 0.9 %
5-40 SYRINGE (ML) INJECTION EVERY 12 HOURS SCHEDULED
Status: DISCONTINUED | OUTPATIENT
Start: 2025-08-07 | End: 2025-08-12 | Stop reason: HOSPADM

## 2025-08-07 RX ORDER — MAGNESIUM SULFATE IN WATER 40 MG/ML
2000 INJECTION, SOLUTION INTRAVENOUS PRN
Status: DISCONTINUED | OUTPATIENT
Start: 2025-08-07 | End: 2025-08-12 | Stop reason: HOSPADM

## 2025-08-07 RX ORDER — SODIUM CHLORIDE 9 MG/ML
INJECTION, SOLUTION INTRAVENOUS CONTINUOUS
Status: DISCONTINUED | OUTPATIENT
Start: 2025-08-07 | End: 2025-08-12

## 2025-08-07 RX ORDER — ACETAMINOPHEN 325 MG/1
650 TABLET ORAL EVERY 6 HOURS PRN
Status: DISCONTINUED | OUTPATIENT
Start: 2025-08-07 | End: 2025-08-12 | Stop reason: HOSPADM

## 2025-08-07 RX ORDER — SENNOSIDES 8.6 MG
325 CAPSULE ORAL ONCE
Status: COMPLETED | OUTPATIENT
Start: 2025-08-07 | End: 2025-08-08

## 2025-08-07 RX ADMIN — PIPERACILLIN AND TAZOBACTAM 4500 MG: 4; .5 INJECTION, POWDER, LYOPHILIZED, FOR SOLUTION INTRAVENOUS at 20:13

## 2025-08-07 RX ADMIN — IOPAMIDOL 80 ML: 612 INJECTION, SOLUTION INTRAVENOUS at 20:01

## 2025-08-07 RX ADMIN — BUTALBITAL, ACETAMINOPHEN, AND CAFFEINE 1 TABLET: 50; 325; 40 TABLET ORAL at 20:38

## 2025-08-07 RX ADMIN — SODIUM CHLORIDE, SODIUM LACTATE, POTASSIUM CHLORIDE, AND CALCIUM CHLORIDE 1000 ML: .6; .31; .03; .02 INJECTION, SOLUTION INTRAVENOUS at 19:00

## 2025-08-07 RX ADMIN — MELATONIN TAB 3 MG 3 MG: 3 TAB at 21:46

## 2025-08-07 RX ADMIN — WATER 1000 MG: 1 INJECTION INTRAMUSCULAR; INTRAVENOUS; SUBCUTANEOUS at 18:57

## 2025-08-07 RX ADMIN — SODIUM CHLORIDE, PRESERVATIVE FREE 10 ML: 5 INJECTION INTRAVENOUS at 21:45

## 2025-08-07 RX ADMIN — SODIUM CHLORIDE: 0.9 INJECTION, SOLUTION INTRAVENOUS at 21:58

## 2025-08-07 RX ADMIN — ACETAMINOPHEN 650 MG: 325 TABLET ORAL at 21:46

## 2025-08-07 ASSESSMENT — PAIN SCALES - GENERAL
PAINLEVEL_OUTOF10: 0
PAINLEVEL_OUTOF10: 6

## 2025-08-07 ASSESSMENT — PAIN DESCRIPTION - LOCATION: LOCATION: HEAD

## 2025-08-07 ASSESSMENT — PAIN DESCRIPTION - ORIENTATION: ORIENTATION: ANTERIOR

## 2025-08-07 ASSESSMENT — PAIN - FUNCTIONAL ASSESSMENT: PAIN_FUNCTIONAL_ASSESSMENT: ACTIVITIES ARE NOT PREVENTED

## 2025-08-07 ASSESSMENT — PAIN DESCRIPTION - ONSET: ONSET: ON-GOING

## 2025-08-07 ASSESSMENT — PAIN DESCRIPTION - FREQUENCY: FREQUENCY: CONTINUOUS

## 2025-08-07 ASSESSMENT — PAIN DESCRIPTION - DESCRIPTORS: DESCRIPTORS: ACHING;DISCOMFORT

## 2025-08-07 ASSESSMENT — PAIN DESCRIPTION - PAIN TYPE: TYPE: ACUTE PAIN

## 2025-08-08 ENCOUNTER — APPOINTMENT (OUTPATIENT)
Facility: HOSPITAL | Age: 70
DRG: 698 | End: 2025-08-08
Attending: HOSPITALIST
Payer: MEDICARE

## 2025-08-08 LAB
ANION GAP SERPL CALC-SCNC: 19 MMOL/L (ref 3–18)
BASOPHILS # BLD: 0.04 K/UL (ref 0–0.1)
BASOPHILS NFR BLD: 0.3 % (ref 0–2)
BUN SERPL-MCNC: 16 MG/DL (ref 6–23)
BUN/CREAT SERPL: 12 (ref 12–20)
CALCIUM SERPL-MCNC: 8.4 MG/DL (ref 8.5–10.1)
CHLORIDE SERPL-SCNC: 98 MMOL/L (ref 98–107)
CO2 SERPL-SCNC: 19 MMOL/L (ref 21–32)
CREAT SERPL-MCNC: 1.28 MG/DL (ref 0.6–1.3)
DIFFERENTIAL METHOD BLD: ABNORMAL
EOSINOPHIL # BLD: 0.04 K/UL (ref 0–0.4)
EOSINOPHIL NFR BLD: 0.3 % (ref 0–5)
ERYTHROCYTE [DISTWIDTH] IN BLOOD BY AUTOMATED COUNT: 14.7 % (ref 11.6–14.5)
GLUCOSE SERPL-MCNC: 108 MG/DL (ref 74–108)
HCT VFR BLD AUTO: 32.6 % (ref 35–45)
HGB BLD-MCNC: 10.7 G/DL (ref 12–16)
IMM GRANULOCYTES # BLD AUTO: 0.03 K/UL (ref 0–0.04)
IMM GRANULOCYTES NFR BLD AUTO: 0.2 % (ref 0–0.5)
LYMPHOCYTES # BLD: 1.07 K/UL (ref 0.9–3.3)
LYMPHOCYTES NFR BLD: 7.4 % (ref 21–52)
MAGNESIUM SERPL-MCNC: 2.7 MG/DL (ref 1.6–2.6)
MCH RBC QN AUTO: 27.8 PG (ref 24–34)
MCHC RBC AUTO-ENTMCNC: 32.8 G/DL (ref 31–37)
MCV RBC AUTO: 84.7 FL (ref 78–100)
MONOCYTES # BLD: 1.38 K/UL (ref 0.05–1.2)
MONOCYTES NFR BLD: 9.6 % (ref 3–10)
NEUTS SEG # BLD: 11.89 K/UL (ref 1.8–8)
NEUTS SEG NFR BLD: 82.2 % (ref 40–73)
NRBC # BLD: 0 K/UL (ref 0–0.01)
NRBC BLD-RTO: 0 PER 100 WBC
PLATELET # BLD AUTO: 266 K/UL (ref 135–420)
PMV BLD AUTO: 10.2 FL (ref 9.2–11.8)
POTASSIUM SERPL-SCNC: 4.5 MMOL/L (ref 3.5–5.5)
RBC # BLD AUTO: 3.85 M/UL (ref 4.2–5.3)
SODIUM SERPL-SCNC: 136 MMOL/L (ref 136–145)
TROPONIN T SERPL HS-MCNC: 32.3 NG/L (ref 0–14)
WBC # BLD AUTO: 14.5 K/UL (ref 4.6–13.2)

## 2025-08-08 PROCEDURE — 2500000003 HC RX 250 WO HCPCS: Performed by: NURSE PRACTITIONER

## 2025-08-08 PROCEDURE — 84484 ASSAY OF TROPONIN QUANT: CPT

## 2025-08-08 PROCEDURE — 97162 PT EVAL MOD COMPLEX 30 MIN: CPT

## 2025-08-08 PROCEDURE — 6360000002 HC RX W HCPCS: Performed by: NURSE PRACTITIONER

## 2025-08-08 PROCEDURE — 6370000000 HC RX 637 (ALT 250 FOR IP): Performed by: NURSE PRACTITIONER

## 2025-08-08 PROCEDURE — 6370000000 HC RX 637 (ALT 250 FOR IP): Performed by: HOSPITALIST

## 2025-08-08 PROCEDURE — 83735 ASSAY OF MAGNESIUM: CPT

## 2025-08-08 PROCEDURE — 85025 COMPLETE CBC W/AUTO DIFF WBC: CPT

## 2025-08-08 PROCEDURE — 2580000003 HC RX 258: Performed by: NURSE PRACTITIONER

## 2025-08-08 PROCEDURE — 36415 COLL VENOUS BLD VENIPUNCTURE: CPT

## 2025-08-08 PROCEDURE — 80048 BASIC METABOLIC PNL TOTAL CA: CPT

## 2025-08-08 PROCEDURE — 97165 OT EVAL LOW COMPLEX 30 MIN: CPT

## 2025-08-08 PROCEDURE — 1100000003 HC PRIVATE W/ TELEMETRY

## 2025-08-08 PROCEDURE — 93306 TTE W/DOPPLER COMPLETE: CPT

## 2025-08-08 RX ADMIN — HEPARIN SODIUM 5000 UNITS: 5000 INJECTION, SOLUTION INTRAVENOUS; SUBCUTANEOUS at 20:55

## 2025-08-08 RX ADMIN — CARBIDOPA AND LEVODOPA 1 TABLET: 25; 100 TABLET ORAL at 09:09

## 2025-08-08 RX ADMIN — AZELASTINE 1 SPRAY: 1 SPRAY, METERED NASAL at 22:22

## 2025-08-08 RX ADMIN — SODIUM CHLORIDE, PRESERVATIVE FREE 10 ML: 5 INJECTION INTRAVENOUS at 22:23

## 2025-08-08 RX ADMIN — SODIUM CHLORIDE, PRESERVATIVE FREE 10 ML: 5 INJECTION INTRAVENOUS at 09:14

## 2025-08-08 RX ADMIN — HEPARIN SODIUM 5000 UNITS: 5000 INJECTION, SOLUTION INTRAVENOUS; SUBCUTANEOUS at 13:44

## 2025-08-08 RX ADMIN — PIPERACILLIN AND TAZOBACTAM 4500 MG: 4; .5 INJECTION, POWDER, LYOPHILIZED, FOR SOLUTION INTRAVENOUS at 13:44

## 2025-08-08 RX ADMIN — ACETAMINOPHEN 650 MG: 325 TABLET ORAL at 05:06

## 2025-08-08 RX ADMIN — BACLOFEN 10 MG: 10 TABLET ORAL at 20:54

## 2025-08-08 RX ADMIN — BACLOFEN 10 MG: 10 TABLET ORAL at 09:09

## 2025-08-08 RX ADMIN — CARBIDOPA AND LEVODOPA 1 TABLET: 25; 100 TABLET ORAL at 20:55

## 2025-08-08 RX ADMIN — GABAPENTIN 400 MG: 400 CAPSULE ORAL at 20:54

## 2025-08-08 RX ADMIN — ASPIRIN 325 MG: 325 TABLET, COATED ORAL at 00:54

## 2025-08-08 RX ADMIN — PIPERACILLIN AND TAZOBACTAM 4500 MG: 4; .5 INJECTION, POWDER, LYOPHILIZED, FOR SOLUTION INTRAVENOUS at 05:00

## 2025-08-08 RX ADMIN — ACETAMINOPHEN 650 MG: 325 TABLET ORAL at 18:15

## 2025-08-08 RX ADMIN — SODIUM CHLORIDE: 0.9 INJECTION, SOLUTION INTRAVENOUS at 15:30

## 2025-08-08 RX ADMIN — PANTOPRAZOLE SODIUM 40 MG: 40 INJECTION, POWDER, FOR SOLUTION INTRAVENOUS at 09:10

## 2025-08-08 RX ADMIN — ATORVASTATIN CALCIUM 20 MG: 20 TABLET, FILM COATED ORAL at 20:54

## 2025-08-08 RX ADMIN — MELATONIN TAB 3 MG 3 MG: 3 TAB at 20:54

## 2025-08-08 RX ADMIN — PIPERACILLIN AND TAZOBACTAM 4500 MG: 4; .5 INJECTION, POWDER, LYOPHILIZED, FOR SOLUTION INTRAVENOUS at 20:56

## 2025-08-08 RX ADMIN — HEPARIN SODIUM 5000 UNITS: 5000 INJECTION, SOLUTION INTRAVENOUS; SUBCUTANEOUS at 04:49

## 2025-08-08 RX ADMIN — BACLOFEN 10 MG: 10 TABLET ORAL at 13:44

## 2025-08-08 RX ADMIN — CLOPIDOGREL BISULFATE 75 MG: 75 TABLET, FILM COATED ORAL at 09:09

## 2025-08-08 RX ADMIN — GABAPENTIN 400 MG: 400 CAPSULE ORAL at 09:10

## 2025-08-08 ASSESSMENT — PAIN - FUNCTIONAL ASSESSMENT
PAIN_FUNCTIONAL_ASSESSMENT: ACTIVITIES ARE NOT PREVENTED
PAIN_FUNCTIONAL_ASSESSMENT: ACTIVITIES ARE NOT PREVENTED

## 2025-08-08 ASSESSMENT — PAIN DESCRIPTION - LOCATION: LOCATION: HEAD

## 2025-08-08 ASSESSMENT — PAIN SCALES - GENERAL
PAINLEVEL_OUTOF10: 0
PAINLEVEL_OUTOF10: 0
PAINLEVEL_OUTOF10: 8
PAINLEVEL_OUTOF10: 0

## 2025-08-08 ASSESSMENT — PAIN DESCRIPTION - DESCRIPTORS: DESCRIPTORS: ACHING

## 2025-08-09 LAB
ANION GAP SERPL CALC-SCNC: 16 MMOL/L (ref 3–18)
BASOPHILS # BLD: 0.03 K/UL (ref 0–0.1)
BASOPHILS NFR BLD: 0.3 % (ref 0–2)
BUN SERPL-MCNC: 16 MG/DL (ref 6–23)
BUN/CREAT SERPL: 14 (ref 12–20)
CALCIUM SERPL-MCNC: 8 MG/DL (ref 8.5–10.1)
CHLORIDE SERPL-SCNC: 102 MMOL/L (ref 98–107)
CO2 SERPL-SCNC: 18 MMOL/L (ref 21–32)
CREAT SERPL-MCNC: 1.14 MG/DL (ref 0.6–1.3)
DIFFERENTIAL METHOD BLD: ABNORMAL
ECHO AO ASC DIAM: 2.7 CM
ECHO AO ASCENDING AORTA INDEX: 1.53 CM/M2
ECHO AO ROOT DIAM: 2.8 CM
ECHO AO ROOT INDEX: 1.58 CM/M2
ECHO AV AREA PEAK VELOCITY: 2.2 CM2
ECHO AV AREA VTI: 2.1 CM2
ECHO AV AREA/BSA PEAK VELOCITY: 1.2 CM2/M2
ECHO AV AREA/BSA VTI: 1.2 CM2/M2
ECHO AV MEAN GRADIENT: 4 MMHG
ECHO AV MEAN VELOCITY: 1 M/S
ECHO AV PEAK GRADIENT: 7 MMHG
ECHO AV PEAK VELOCITY: 1.3 M/S
ECHO AV VELOCITY RATIO: 0.85
ECHO AV VTI: 22.5 CM
ECHO BSA: 1.78 M2
ECHO EST RA PRESSURE: 3 MMHG
ECHO LA DIAMETER INDEX: 2.09 CM/M2
ECHO LA DIAMETER: 3.7 CM
ECHO LA TO AORTIC ROOT RATIO: 1.32
ECHO LA VOL A-L A2C: 34 ML (ref 22–52)
ECHO LA VOL A-L A4C: 44 ML (ref 22–52)
ECHO LA VOL BP: 38 ML (ref 22–52)
ECHO LA VOL MOD A2C: 33 ML (ref 22–52)
ECHO LA VOL MOD A4C: 43 ML (ref 22–52)
ECHO LA VOL/BSA BIPLANE: 21 ML/M2 (ref 16–34)
ECHO LA VOLUME AREA LENGTH: 39 ML
ECHO LA VOLUME INDEX A-L A2C: 19 ML/M2 (ref 16–34)
ECHO LA VOLUME INDEX A-L A4C: 25 ML/M2 (ref 16–34)
ECHO LA VOLUME INDEX AREA LENGTH: 22 ML/M2 (ref 16–34)
ECHO LA VOLUME INDEX MOD A2C: 19 ML/M2 (ref 16–34)
ECHO LA VOLUME INDEX MOD A4C: 24 ML/M2 (ref 16–34)
ECHO LV E' LATERAL VELOCITY: 11.53 CM/S
ECHO LV E' SEPTAL VELOCITY: 10.06 CM/S
ECHO LV EDV A2C: 36 ML
ECHO LV EDV A4C: 39 ML
ECHO LV EDV BP: 39 ML (ref 56–104)
ECHO LV EDV INDEX A4C: 22 ML/M2
ECHO LV EDV INDEX BP: 22 ML/M2
ECHO LV EDV NDEX A2C: 20 ML/M2
ECHO LV EF PHYSICIAN: 61 %
ECHO LV EJECTION FRACTION A2C: 64 %
ECHO LV EJECTION FRACTION A4C: 61 %
ECHO LV EJECTION FRACTION BIPLANE: 63 % (ref 55–100)
ECHO LV ESV A2C: 13 ML
ECHO LV ESV A4C: 15 ML
ECHO LV ESV BP: 15 ML (ref 19–49)
ECHO LV ESV INDEX A2C: 7 ML/M2
ECHO LV ESV INDEX A4C: 8 ML/M2
ECHO LV ESV INDEX BP: 8 ML/M2
ECHO LV FRACTIONAL SHORTENING: 34 % (ref 28–44)
ECHO LV INTERNAL DIMENSION DIASTOLE INDEX: 2.32 CM/M2
ECHO LV INTERNAL DIMENSION DIASTOLIC: 4.1 CM (ref 3.9–5.3)
ECHO LV INTERNAL DIMENSION SYSTOLIC INDEX: 1.53 CM/M2
ECHO LV INTERNAL DIMENSION SYSTOLIC: 2.7 CM
ECHO LV IVSD: 1 CM (ref 0.6–0.9)
ECHO LV MASS 2D: 132.1 G (ref 67–162)
ECHO LV MASS INDEX 2D: 74.6 G/M2 (ref 43–95)
ECHO LV POSTERIOR WALL DIASTOLIC: 1 CM (ref 0.6–0.9)
ECHO LV RELATIVE WALL THICKNESS RATIO: 0.49
ECHO LVOT AREA: 2.8 CM2
ECHO LVOT AV VTI INDEX: 0.76
ECHO LVOT DIAM: 1.9 CM
ECHO LVOT MEAN GRADIENT: 3 MMHG
ECHO LVOT PEAK GRADIENT: 4 MMHG
ECHO LVOT PEAK VELOCITY: 1.1 M/S
ECHO LVOT STROKE VOLUME INDEX: 27.2 ML/M2
ECHO LVOT SV: 48.2 ML
ECHO LVOT VTI: 17 CM
ECHO MV A VELOCITY: 1.26 M/S
ECHO MV AREA VTI: 1.8 CM2
ECHO MV E DECELERATION TIME (DT): 201.7 MS
ECHO MV E VELOCITY: 0.81 M/S
ECHO MV E/A RATIO: 0.64
ECHO MV E/E' LATERAL: 7.03
ECHO MV E/E' RATIO (AVERAGED): 7.54
ECHO MV E/E' SEPTAL: 8.05
ECHO MV LVOT VTI INDEX: 1.56
ECHO MV MAX VELOCITY: 1.1 M/S
ECHO MV MEAN GRADIENT: 2 MMHG
ECHO MV MEAN VELOCITY: 0.7 M/S
ECHO MV PEAK GRADIENT: 5 MMHG
ECHO MV VTI: 26.5 CM
ECHO RA END SYSTOLIC VOLUME APICAL 4 CHAMBER INDEX BSA: 10 ML/M2
ECHO RA VOLUME: 17 ML
ECHO RIGHT VENTRICULAR SYSTOLIC PRESSURE (RVSP): 25 MMHG
ECHO RV FREE WALL PEAK S': 16.4 CM/S
ECHO RV INTERNAL DIMENSION: 3 CM
ECHO RV TAPSE: 1.8 CM (ref 1.7–?)
ECHO RVOT MEAN GRADIENT: 3 MMHG
ECHO RVOT PEAK GRADIENT: 4 MMHG
ECHO RVOT PEAK VELOCITY: 1 M/S
ECHO RVOT VTI: 20.8 CM
ECHO TV REGURGITANT MAX VELOCITY: 2.33 M/S
ECHO TV REGURGITANT PEAK GRADIENT: 22 MMHG
EOSINOPHIL # BLD: 0.08 K/UL (ref 0–0.4)
EOSINOPHIL NFR BLD: 0.8 % (ref 0–5)
ERYTHROCYTE [DISTWIDTH] IN BLOOD BY AUTOMATED COUNT: 14.6 % (ref 11.6–14.5)
GLUCOSE SERPL-MCNC: 105 MG/DL (ref 74–108)
HCT VFR BLD AUTO: 30.7 % (ref 35–45)
HGB BLD-MCNC: 10 G/DL (ref 12–16)
IMM GRANULOCYTES # BLD AUTO: 0.05 K/UL (ref 0–0.04)
IMM GRANULOCYTES NFR BLD AUTO: 0.5 % (ref 0–0.5)
LYMPHOCYTES # BLD: 1.38 K/UL (ref 0.9–3.3)
LYMPHOCYTES NFR BLD: 14.2 % (ref 21–52)
MAGNESIUM SERPL-MCNC: 2.6 MG/DL (ref 1.6–2.6)
MCH RBC QN AUTO: 27.8 PG (ref 24–34)
MCHC RBC AUTO-ENTMCNC: 32.6 G/DL (ref 31–37)
MCV RBC AUTO: 85.3 FL (ref 78–100)
MONOCYTES # BLD: 1.14 K/UL (ref 0.05–1.2)
MONOCYTES NFR BLD: 11.7 % (ref 3–10)
NEUTS SEG # BLD: 7.03 K/UL (ref 1.8–8)
NEUTS SEG NFR BLD: 72.5 % (ref 40–73)
NRBC # BLD: 0 K/UL (ref 0–0.01)
NRBC BLD-RTO: 0 PER 100 WBC
PLATELET # BLD AUTO: 268 K/UL (ref 135–420)
PMV BLD AUTO: 9.4 FL (ref 9.2–11.8)
POTASSIUM SERPL-SCNC: 3.8 MMOL/L (ref 3.5–5.5)
RBC # BLD AUTO: 3.6 M/UL (ref 4.2–5.3)
SODIUM SERPL-SCNC: 136 MMOL/L (ref 136–145)
WBC # BLD AUTO: 9.7 K/UL (ref 4.6–13.2)

## 2025-08-09 PROCEDURE — 93306 TTE W/DOPPLER COMPLETE: CPT | Performed by: INTERNAL MEDICINE

## 2025-08-09 PROCEDURE — 80048 BASIC METABOLIC PNL TOTAL CA: CPT

## 2025-08-09 PROCEDURE — 2580000003 HC RX 258: Performed by: NURSE PRACTITIONER

## 2025-08-09 PROCEDURE — 36415 COLL VENOUS BLD VENIPUNCTURE: CPT

## 2025-08-09 PROCEDURE — 85025 COMPLETE CBC W/AUTO DIFF WBC: CPT

## 2025-08-09 PROCEDURE — 6360000002 HC RX W HCPCS: Performed by: NURSE PRACTITIONER

## 2025-08-09 PROCEDURE — 6370000000 HC RX 637 (ALT 250 FOR IP): Performed by: FAMILY MEDICINE

## 2025-08-09 PROCEDURE — 2500000003 HC RX 250 WO HCPCS: Performed by: NURSE PRACTITIONER

## 2025-08-09 PROCEDURE — 1100000003 HC PRIVATE W/ TELEMETRY

## 2025-08-09 PROCEDURE — 6370000000 HC RX 637 (ALT 250 FOR IP): Performed by: NURSE PRACTITIONER

## 2025-08-09 PROCEDURE — 83735 ASSAY OF MAGNESIUM: CPT

## 2025-08-09 RX ORDER — ZOLPIDEM TARTRATE 5 MG/1
5 TABLET ORAL NIGHTLY PRN
Status: DISCONTINUED | OUTPATIENT
Start: 2025-08-09 | End: 2025-08-12 | Stop reason: HOSPADM

## 2025-08-09 RX ORDER — BUTALBITAL, ACETAMINOPHEN AND CAFFEINE 50; 325; 40 MG/1; MG/1; MG/1
1 TABLET ORAL EVERY 4 HOURS PRN
Status: DISCONTINUED | OUTPATIENT
Start: 2025-08-09 | End: 2025-08-12 | Stop reason: HOSPADM

## 2025-08-09 RX ADMIN — ZOLPIDEM TARTRATE 5 MG: 5 TABLET, FILM COATED ORAL at 23:14

## 2025-08-09 RX ADMIN — SODIUM CHLORIDE, PRESERVATIVE FREE 10 ML: 5 INJECTION INTRAVENOUS at 09:10

## 2025-08-09 RX ADMIN — BACLOFEN 10 MG: 10 TABLET ORAL at 09:09

## 2025-08-09 RX ADMIN — ATORVASTATIN CALCIUM 20 MG: 20 TABLET, FILM COATED ORAL at 21:39

## 2025-08-09 RX ADMIN — BACLOFEN 10 MG: 10 TABLET ORAL at 14:58

## 2025-08-09 RX ADMIN — HEPARIN SODIUM 5000 UNITS: 5000 INJECTION, SOLUTION INTRAVENOUS; SUBCUTANEOUS at 04:59

## 2025-08-09 RX ADMIN — BUTALBITAL, ACETAMINOPHEN, AND CAFFEINE 1 TABLET: 50; 325; 40 TABLET ORAL at 11:02

## 2025-08-09 RX ADMIN — HEPARIN SODIUM 5000 UNITS: 5000 INJECTION, SOLUTION INTRAVENOUS; SUBCUTANEOUS at 21:39

## 2025-08-09 RX ADMIN — PIPERACILLIN AND TAZOBACTAM 4500 MG: 4; .5 INJECTION, POWDER, LYOPHILIZED, FOR SOLUTION INTRAVENOUS at 12:55

## 2025-08-09 RX ADMIN — BACLOFEN 10 MG: 10 TABLET ORAL at 21:38

## 2025-08-09 RX ADMIN — GABAPENTIN 400 MG: 400 CAPSULE ORAL at 09:09

## 2025-08-09 RX ADMIN — CARBIDOPA AND LEVODOPA 1 TABLET: 25; 100 TABLET ORAL at 09:09

## 2025-08-09 RX ADMIN — CLOPIDOGREL BISULFATE 75 MG: 75 TABLET, FILM COATED ORAL at 09:09

## 2025-08-09 RX ADMIN — CARBIDOPA AND LEVODOPA 1 TABLET: 25; 100 TABLET ORAL at 21:38

## 2025-08-09 RX ADMIN — HEPARIN SODIUM 5000 UNITS: 5000 INJECTION, SOLUTION INTRAVENOUS; SUBCUTANEOUS at 14:58

## 2025-08-09 RX ADMIN — GABAPENTIN 400 MG: 400 CAPSULE ORAL at 21:38

## 2025-08-09 RX ADMIN — BUTALBITAL, ACETAMINOPHEN, AND CAFFEINE 1 TABLET: 50; 325; 40 TABLET ORAL at 22:31

## 2025-08-09 RX ADMIN — ACETAMINOPHEN 650 MG: 325 TABLET ORAL at 04:59

## 2025-08-09 RX ADMIN — PIPERACILLIN AND TAZOBACTAM 4500 MG: 4; .5 INJECTION, POWDER, LYOPHILIZED, FOR SOLUTION INTRAVENOUS at 04:55

## 2025-08-09 RX ADMIN — PIPERACILLIN AND TAZOBACTAM 4500 MG: 4; .5 INJECTION, POWDER, LYOPHILIZED, FOR SOLUTION INTRAVENOUS at 23:07

## 2025-08-09 RX ADMIN — PANTOPRAZOLE SODIUM 40 MG: 40 INJECTION, POWDER, FOR SOLUTION INTRAVENOUS at 09:09

## 2025-08-09 ASSESSMENT — PAIN SCALES - GENERAL
PAINLEVEL_OUTOF10: 0
PAINLEVEL_OUTOF10: 0

## 2025-08-10 LAB
ANION GAP SERPL CALC-SCNC: 14 MMOL/L (ref 3–18)
BACTERIA SPEC CULT: ABNORMAL
BASOPHILS # BLD: 0.02 K/UL (ref 0–0.1)
BASOPHILS NFR BLD: 0.3 % (ref 0–2)
BUN SERPL-MCNC: 15 MG/DL (ref 6–23)
BUN/CREAT SERPL: 15 (ref 12–20)
CALCIUM SERPL-MCNC: 8.3 MG/DL (ref 8.5–10.1)
CC UR VC: ABNORMAL
CHLORIDE SERPL-SCNC: 104 MMOL/L (ref 98–107)
CO2 SERPL-SCNC: 20 MMOL/L (ref 21–32)
CREAT SERPL-MCNC: 1.01 MG/DL (ref 0.6–1.3)
DIFFERENTIAL METHOD BLD: ABNORMAL
EOSINOPHIL # BLD: 0.16 K/UL (ref 0–0.4)
EOSINOPHIL NFR BLD: 2.2 % (ref 0–5)
ERYTHROCYTE [DISTWIDTH] IN BLOOD BY AUTOMATED COUNT: 14.8 % (ref 11.6–14.5)
GLUCOSE SERPL-MCNC: 119 MG/DL (ref 74–108)
HCT VFR BLD AUTO: 34.2 % (ref 35–45)
HGB BLD-MCNC: 10.6 G/DL (ref 12–16)
IMM GRANULOCYTES # BLD AUTO: 0.03 K/UL (ref 0–0.04)
IMM GRANULOCYTES NFR BLD AUTO: 0.4 % (ref 0–0.5)
LYMPHOCYTES # BLD: 2.13 K/UL (ref 0.9–3.3)
LYMPHOCYTES NFR BLD: 29.2 % (ref 21–52)
MAGNESIUM SERPL-MCNC: 2.7 MG/DL (ref 1.6–2.6)
MCH RBC QN AUTO: 27.5 PG (ref 24–34)
MCHC RBC AUTO-ENTMCNC: 31 G/DL (ref 31–37)
MCV RBC AUTO: 88.6 FL (ref 78–100)
MONOCYTES # BLD: 0.76 K/UL (ref 0.05–1.2)
MONOCYTES NFR BLD: 10.4 % (ref 3–10)
NEUTS SEG # BLD: 4.19 K/UL (ref 1.8–8)
NEUTS SEG NFR BLD: 57.5 % (ref 40–73)
NRBC # BLD: 0 K/UL (ref 0–0.01)
NRBC BLD-RTO: 0 PER 100 WBC
PLATELET # BLD AUTO: 315 K/UL (ref 135–420)
PMV BLD AUTO: 9.5 FL (ref 9.2–11.8)
POTASSIUM SERPL-SCNC: 3.5 MMOL/L (ref 3.5–5.5)
RBC # BLD AUTO: 3.86 M/UL (ref 4.2–5.3)
SERVICE CMNT-IMP: ABNORMAL
SODIUM SERPL-SCNC: 138 MMOL/L (ref 136–145)
WBC # BLD AUTO: 7.3 K/UL (ref 4.6–13.2)

## 2025-08-10 PROCEDURE — 36415 COLL VENOUS BLD VENIPUNCTURE: CPT

## 2025-08-10 PROCEDURE — 6370000000 HC RX 637 (ALT 250 FOR IP): Performed by: FAMILY MEDICINE

## 2025-08-10 PROCEDURE — 1100000003 HC PRIVATE W/ TELEMETRY

## 2025-08-10 PROCEDURE — 2500000003 HC RX 250 WO HCPCS: Performed by: NURSE PRACTITIONER

## 2025-08-10 PROCEDURE — 6360000002 HC RX W HCPCS: Performed by: NURSE PRACTITIONER

## 2025-08-10 PROCEDURE — 2580000003 HC RX 258: Performed by: NURSE PRACTITIONER

## 2025-08-10 PROCEDURE — 83735 ASSAY OF MAGNESIUM: CPT

## 2025-08-10 PROCEDURE — 6370000000 HC RX 637 (ALT 250 FOR IP): Performed by: NURSE PRACTITIONER

## 2025-08-10 PROCEDURE — 97530 THERAPEUTIC ACTIVITIES: CPT

## 2025-08-10 PROCEDURE — 80048 BASIC METABOLIC PNL TOTAL CA: CPT

## 2025-08-10 PROCEDURE — 85025 COMPLETE CBC W/AUTO DIFF WBC: CPT

## 2025-08-10 RX ADMIN — PIPERACILLIN AND TAZOBACTAM 4500 MG: 4; .5 INJECTION, POWDER, LYOPHILIZED, FOR SOLUTION INTRAVENOUS at 13:21

## 2025-08-10 RX ADMIN — CARBIDOPA AND LEVODOPA 1 TABLET: 25; 100 TABLET ORAL at 22:01

## 2025-08-10 RX ADMIN — GABAPENTIN 400 MG: 400 CAPSULE ORAL at 22:01

## 2025-08-10 RX ADMIN — PIPERACILLIN AND TAZOBACTAM 4500 MG: 4; .5 INJECTION, POWDER, LYOPHILIZED, FOR SOLUTION INTRAVENOUS at 23:27

## 2025-08-10 RX ADMIN — HEPARIN SODIUM 5000 UNITS: 5000 INJECTION, SOLUTION INTRAVENOUS; SUBCUTANEOUS at 06:39

## 2025-08-10 RX ADMIN — PANTOPRAZOLE SODIUM 40 MG: 40 INJECTION, POWDER, FOR SOLUTION INTRAVENOUS at 13:12

## 2025-08-10 RX ADMIN — ATORVASTATIN CALCIUM 20 MG: 20 TABLET, FILM COATED ORAL at 22:01

## 2025-08-10 RX ADMIN — CARBIDOPA AND LEVODOPA 1 TABLET: 25; 100 TABLET ORAL at 10:45

## 2025-08-10 RX ADMIN — BUTALBITAL, ACETAMINOPHEN, AND CAFFEINE 1 TABLET: 50; 325; 40 TABLET ORAL at 12:07

## 2025-08-10 RX ADMIN — HEPARIN SODIUM 5000 UNITS: 5000 INJECTION, SOLUTION INTRAVENOUS; SUBCUTANEOUS at 22:01

## 2025-08-10 RX ADMIN — SODIUM CHLORIDE, PRESERVATIVE FREE 10 ML: 5 INJECTION INTRAVENOUS at 22:02

## 2025-08-10 RX ADMIN — BACLOFEN 10 MG: 10 TABLET ORAL at 10:45

## 2025-08-10 RX ADMIN — ZOLPIDEM TARTRATE 5 MG: 5 TABLET, FILM COATED ORAL at 23:25

## 2025-08-10 RX ADMIN — BACLOFEN 10 MG: 10 TABLET ORAL at 22:01

## 2025-08-10 RX ADMIN — GABAPENTIN 400 MG: 400 CAPSULE ORAL at 10:45

## 2025-08-10 RX ADMIN — BACLOFEN 10 MG: 10 TABLET ORAL at 13:11

## 2025-08-10 RX ADMIN — CLOPIDOGREL BISULFATE 75 MG: 75 TABLET, FILM COATED ORAL at 10:45

## 2025-08-10 RX ADMIN — HEPARIN SODIUM 5000 UNITS: 5000 INJECTION, SOLUTION INTRAVENOUS; SUBCUTANEOUS at 13:00

## 2025-08-10 ASSESSMENT — PAIN SCALES - GENERAL
PAINLEVEL_OUTOF10: 8
PAINLEVEL_OUTOF10: 8
PAINLEVEL_OUTOF10: 0
PAINLEVEL_OUTOF10: 8
PAINLEVEL_OUTOF10: 0
PAINLEVEL_OUTOF10: 8

## 2025-08-10 ASSESSMENT — PAIN - FUNCTIONAL ASSESSMENT
PAIN_FUNCTIONAL_ASSESSMENT: 0-10

## 2025-08-10 ASSESSMENT — PAIN DESCRIPTION - LOCATION: LOCATION: HEAD

## 2025-08-11 PROBLEM — B96.4 BACTEREMIA DUE TO PROTEUS SPECIES: Status: ACTIVE | Noted: 2025-08-11

## 2025-08-11 PROBLEM — R78.81 BACTEREMIA DUE TO PROTEUS SPECIES: Status: ACTIVE | Noted: 2025-08-11

## 2025-08-11 LAB
ACB COMPLEX DNA BLD POS QL NAA+NON-PROBE: NOT DETECTED
ACCESSION NUMBER, LLC1M: ABNORMAL
B FRAGILIS DNA BLD POS QL NAA+NON-PROBE: NOT DETECTED
BIOFIRE TEST COMMENT: ABNORMAL
BLACTX-M ISLT/SPM QL: NOT DETECTED
BLAIMP ISLT/SPM QL: NOT DETECTED
BLAKPC ISLT/SPM QL: NOT DETECTED
BLAOXA-48-LIKE ISLT/SPM QL: NOT DETECTED
BLAVIM ISLT/SPM QL: NOT DETECTED
C ALBICANS DNA BLD POS QL NAA+NON-PROBE: NOT DETECTED
C AURIS DNA BLD POS QL NAA+NON-PROBE: NOT DETECTED
C GATTII+NEOFOR DNA BLD POS QL NAA+N-PRB: NOT DETECTED
C GLABRATA DNA BLD POS QL NAA+NON-PROBE: NOT DETECTED
C KRUSEI DNA BLD POS QL NAA+NON-PROBE: NOT DETECTED
C PARAP DNA BLD POS QL NAA+NON-PROBE: NOT DETECTED
C TROPICLS DNA BLD POS QL NAA+NON-PROBE: NOT DETECTED
E CLOAC COMP DNA BLD POS NAA+NON-PROBE: NOT DETECTED
E COLI DNA BLD POS QL NAA+NON-PROBE: NOT DETECTED
E FAECALIS DNA BLD POS QL NAA+NON-PROBE: NOT DETECTED
E FAECIUM DNA BLD POS QL NAA+NON-PROBE: NOT DETECTED
EKG ATRIAL RATE: 104 BPM
EKG DIAGNOSIS: NORMAL
EKG P AXIS: 19 DEGREES
EKG P-R INTERVAL: 180 MS
EKG Q-T INTERVAL: 358 MS
EKG QRS DURATION: 90 MS
EKG QTC CALCULATION (BAZETT): 470 MS
EKG R AXIS: 17 DEGREES
EKG T AXIS: 30 DEGREES
EKG VENTRICULAR RATE: 104 BPM
ENTEROBACTERALES DNA BLD POS NAA+N-PRB: DETECTED
GP B STREP DNA BLD POS QL NAA+NON-PROBE: NOT DETECTED
HAEM INFLU DNA BLD POS QL NAA+NON-PROBE: NOT DETECTED
K OXYTOCA DNA BLD POS QL NAA+NON-PROBE: NOT DETECTED
KLEBSIELLA SP DNA BLD POS QL NAA+NON-PRB: NOT DETECTED
KLEBSIELLA SP DNA BLD POS QL NAA+NON-PRB: NOT DETECTED
L MONOCYTOG DNA BLD POS QL NAA+NON-PROBE: NOT DETECTED
N MEN DNA BLD POS QL NAA+NON-PROBE: NOT DETECTED
P AERUGINOSA DNA BLD POS NAA+NON-PROBE: NOT DETECTED
PROTEUS SP DNA BLD POS QL NAA+NON-PROBE: DETECTED
RESISTANT GENE NDM BY PCR: NOT DETECTED
RESISTANT GENE TARGETS: ABNORMAL
S AUREUS DNA BLD POS QL NAA+NON-PROBE: NOT DETECTED
S AUREUS+CONS DNA BLD POS NAA+NON-PROBE: NOT DETECTED
S EPIDERMIDIS DNA BLD POS QL NAA+NON-PRB: NOT DETECTED
S LUGDUNENSIS DNA BLD POS QL NAA+NON-PRB: NOT DETECTED
S MALTOPHILIA DNA BLD POS QL NAA+NON-PRB: NOT DETECTED
S MARCESCENS DNA BLD POS NAA+NON-PROBE: NOT DETECTED
S PNEUM DNA BLD POS QL NAA+NON-PROBE: NOT DETECTED
S PYO DNA BLD POS QL NAA+NON-PROBE: NOT DETECTED
SALMONELLA DNA BLD POS QL NAA+NON-PROBE: NOT DETECTED
STREPTOCOCCUS DNA BLD POS NAA+NON-PROBE: NOT DETECTED

## 2025-08-11 PROCEDURE — 6360000002 HC RX W HCPCS: Performed by: NURSE PRACTITIONER

## 2025-08-11 PROCEDURE — 97535 SELF CARE MNGMENT TRAINING: CPT

## 2025-08-11 PROCEDURE — 2500000003 HC RX 250 WO HCPCS: Performed by: NURSE PRACTITIONER

## 2025-08-11 PROCEDURE — 6370000000 HC RX 637 (ALT 250 FOR IP): Performed by: NURSE PRACTITIONER

## 2025-08-11 PROCEDURE — 2580000003 HC RX 258: Performed by: NURSE PRACTITIONER

## 2025-08-11 PROCEDURE — 2580000003 HC RX 258: Performed by: INTERNAL MEDICINE

## 2025-08-11 PROCEDURE — 1100000003 HC PRIVATE W/ TELEMETRY

## 2025-08-11 PROCEDURE — 6370000000 HC RX 637 (ALT 250 FOR IP): Performed by: FAMILY MEDICINE

## 2025-08-11 PROCEDURE — 6360000002 HC RX W HCPCS: Performed by: INTERNAL MEDICINE

## 2025-08-11 PROCEDURE — 6370000000 HC RX 637 (ALT 250 FOR IP): Performed by: INTERNAL MEDICINE

## 2025-08-11 RX ORDER — SULFAMETHOXAZOLE AND TRIMETHOPRIM 800; 160 MG/1; MG/1
1 TABLET ORAL EVERY 12 HOURS SCHEDULED
Status: DISCONTINUED | OUTPATIENT
Start: 2025-08-11 | End: 2025-08-12 | Stop reason: HOSPADM

## 2025-08-11 RX ORDER — KETOROLAC TROMETHAMINE 15 MG/ML
15 INJECTION, SOLUTION INTRAMUSCULAR; INTRAVENOUS
Status: COMPLETED | OUTPATIENT
Start: 2025-08-11 | End: 2025-08-11

## 2025-08-11 RX ORDER — PANTOPRAZOLE SODIUM 40 MG/1
40 TABLET, DELAYED RELEASE ORAL DAILY
Status: DISCONTINUED | OUTPATIENT
Start: 2025-08-12 | End: 2025-08-12 | Stop reason: HOSPADM

## 2025-08-11 RX ADMIN — HEPARIN SODIUM 5000 UNITS: 5000 INJECTION, SOLUTION INTRAVENOUS; SUBCUTANEOUS at 08:13

## 2025-08-11 RX ADMIN — KETOROLAC TROMETHAMINE 15 MG: 15 INJECTION, SOLUTION INTRAMUSCULAR; INTRAVENOUS at 21:44

## 2025-08-11 RX ADMIN — GABAPENTIN 400 MG: 400 CAPSULE ORAL at 21:35

## 2025-08-11 RX ADMIN — BACLOFEN 10 MG: 10 TABLET ORAL at 13:45

## 2025-08-11 RX ADMIN — SODIUM CHLORIDE, PRESERVATIVE FREE 10 ML: 5 INJECTION INTRAVENOUS at 09:14

## 2025-08-11 RX ADMIN — PROMETHAZINE HYDROCHLORIDE 25 MG: 50 INJECTION INTRAMUSCULAR at 21:48

## 2025-08-11 RX ADMIN — HEPARIN SODIUM 5000 UNITS: 5000 INJECTION, SOLUTION INTRAVENOUS; SUBCUTANEOUS at 13:45

## 2025-08-11 RX ADMIN — CARBIDOPA AND LEVODOPA 1 TABLET: 25; 100 TABLET ORAL at 21:35

## 2025-08-11 RX ADMIN — HEPARIN SODIUM 5000 UNITS: 5000 INJECTION, SOLUTION INTRAVENOUS; SUBCUTANEOUS at 21:36

## 2025-08-11 RX ADMIN — BACLOFEN 10 MG: 10 TABLET ORAL at 21:36

## 2025-08-11 RX ADMIN — SULFAMETHOXAZOLE AND TRIMETHOPRIM 1 TABLET: 800; 160 TABLET ORAL at 21:36

## 2025-08-11 RX ADMIN — SODIUM CHLORIDE, PRESERVATIVE FREE 10 ML: 5 INJECTION INTRAVENOUS at 21:37

## 2025-08-11 RX ADMIN — BACLOFEN 10 MG: 10 TABLET ORAL at 09:14

## 2025-08-11 RX ADMIN — CARBIDOPA AND LEVODOPA 1 TABLET: 25; 100 TABLET ORAL at 09:14

## 2025-08-11 RX ADMIN — PANTOPRAZOLE SODIUM 40 MG: 40 INJECTION, POWDER, FOR SOLUTION INTRAVENOUS at 09:14

## 2025-08-11 RX ADMIN — ATORVASTATIN CALCIUM 20 MG: 20 TABLET, FILM COATED ORAL at 21:36

## 2025-08-11 RX ADMIN — PIPERACILLIN AND TAZOBACTAM 4500 MG: 4; .5 INJECTION, POWDER, LYOPHILIZED, FOR SOLUTION INTRAVENOUS at 08:21

## 2025-08-11 RX ADMIN — BUTALBITAL, ACETAMINOPHEN, AND CAFFEINE 1 TABLET: 50; 325; 40 TABLET ORAL at 14:28

## 2025-08-11 RX ADMIN — GABAPENTIN 400 MG: 400 CAPSULE ORAL at 09:14

## 2025-08-11 RX ADMIN — CLOPIDOGREL BISULFATE 75 MG: 75 TABLET, FILM COATED ORAL at 09:14

## 2025-08-11 RX ADMIN — ZOLPIDEM TARTRATE 5 MG: 5 TABLET, FILM COATED ORAL at 21:44

## 2025-08-11 ASSESSMENT — PAIN DESCRIPTION - ORIENTATION: ORIENTATION: ANTERIOR

## 2025-08-11 ASSESSMENT — PAIN SCALES - GENERAL
PAINLEVEL_OUTOF10: 0
PAINLEVEL_OUTOF10: 7

## 2025-08-11 ASSESSMENT — PAIN DESCRIPTION - LOCATION: LOCATION: HEAD

## 2025-08-11 ASSESSMENT — PAIN DESCRIPTION - DESCRIPTORS: DESCRIPTORS: ACHING

## 2025-08-11 ASSESSMENT — PAIN - FUNCTIONAL ASSESSMENT: PAIN_FUNCTIONAL_ASSESSMENT: 0-10

## 2025-08-12 VITALS
HEIGHT: 66 IN | SYSTOLIC BLOOD PRESSURE: 128 MMHG | RESPIRATION RATE: 16 BRPM | HEART RATE: 87 BPM | DIASTOLIC BLOOD PRESSURE: 74 MMHG | OXYGEN SATURATION: 96 % | TEMPERATURE: 97.2 F | BODY MASS INDEX: 24.11 KG/M2 | WEIGHT: 150 LBS

## 2025-08-12 LAB
BACTERIA SPEC CULT: ABNORMAL
BACTERIA SPEC CULT: ABNORMAL
GRAM STN SPEC: ABNORMAL
SERVICE CMNT-IMP: ABNORMAL

## 2025-08-12 PROCEDURE — 97112 NEUROMUSCULAR REEDUCATION: CPT

## 2025-08-12 PROCEDURE — 2500000003 HC RX 250 WO HCPCS: Performed by: NURSE PRACTITIONER

## 2025-08-12 PROCEDURE — 97110 THERAPEUTIC EXERCISES: CPT

## 2025-08-12 PROCEDURE — 6370000000 HC RX 637 (ALT 250 FOR IP): Performed by: INTERNAL MEDICINE

## 2025-08-12 PROCEDURE — 6370000000 HC RX 637 (ALT 250 FOR IP): Performed by: NURSE PRACTITIONER

## 2025-08-12 PROCEDURE — 6360000002 HC RX W HCPCS: Performed by: NURSE PRACTITIONER

## 2025-08-12 PROCEDURE — 97530 THERAPEUTIC ACTIVITIES: CPT

## 2025-08-12 RX ORDER — SULFAMETHOXAZOLE AND TRIMETHOPRIM 800; 160 MG/1; MG/1
TABLET ORAL
Qty: 23 TABLET | Refills: 0 | Status: ON HOLD | OUTPATIENT
Start: 2025-08-12 | End: 2025-08-29 | Stop reason: HOSPADM

## 2025-08-12 RX ADMIN — CARBIDOPA AND LEVODOPA 1 TABLET: 25; 100 TABLET ORAL at 08:33

## 2025-08-12 RX ADMIN — PANTOPRAZOLE SODIUM 40 MG: 40 TABLET, DELAYED RELEASE ORAL at 08:33

## 2025-08-12 RX ADMIN — SULFAMETHOXAZOLE AND TRIMETHOPRIM 1 TABLET: 800; 160 TABLET ORAL at 08:34

## 2025-08-12 RX ADMIN — SODIUM CHLORIDE, PRESERVATIVE FREE 10 ML: 5 INJECTION INTRAVENOUS at 08:34

## 2025-08-12 RX ADMIN — GABAPENTIN 400 MG: 400 CAPSULE ORAL at 08:33

## 2025-08-12 RX ADMIN — CLOPIDOGREL BISULFATE 75 MG: 75 TABLET, FILM COATED ORAL at 08:33

## 2025-08-12 RX ADMIN — BACLOFEN 10 MG: 10 TABLET ORAL at 08:33

## 2025-08-12 RX ADMIN — HEPARIN SODIUM 5000 UNITS: 5000 INJECTION, SOLUTION INTRAVENOUS; SUBCUTANEOUS at 06:27

## 2025-08-12 ASSESSMENT — PAIN SCALES - GENERAL
PAINLEVEL_OUTOF10: 0

## 2025-08-13 LAB
BACTERIA SPEC CULT: NORMAL
SERVICE CMNT-IMP: NORMAL

## 2025-08-19 ENCOUNTER — ANESTHESIA EVENT (OUTPATIENT)
Facility: HOSPITAL | Age: 70
End: 2025-08-19
Payer: MEDICARE

## 2025-08-23 ENCOUNTER — APPOINTMENT (OUTPATIENT)
Facility: HOSPITAL | Age: 70
End: 2025-08-23
Payer: MEDICARE

## 2025-08-23 ENCOUNTER — HOSPITAL ENCOUNTER (INPATIENT)
Facility: HOSPITAL | Age: 70
LOS: 6 days | Discharge: NURSING, REHAB OR LONG-TERM CARE | End: 2025-08-29
Attending: STUDENT IN AN ORGANIZED HEALTH CARE EDUCATION/TRAINING PROGRAM | Admitting: HOSPITALIST
Payer: MEDICARE

## 2025-08-23 ENCOUNTER — ANESTHESIA (OUTPATIENT)
Facility: HOSPITAL | Age: 70
End: 2025-08-23
Payer: MEDICARE

## 2025-08-23 ENCOUNTER — ANESTHESIA EVENT (OUTPATIENT)
Facility: HOSPITAL | Age: 70
End: 2025-08-23
Payer: MEDICARE

## 2025-08-23 DIAGNOSIS — E87.20 LACTIC ACIDOSIS: ICD-10-CM

## 2025-08-23 DIAGNOSIS — A41.9 SEPSIS WITH ENCEPHALOPATHY WITHOUT SEPTIC SHOCK, DUE TO UNSPECIFIED ORGANISM (HCC): ICD-10-CM

## 2025-08-23 DIAGNOSIS — N20.0 KIDNEY STONE: ICD-10-CM

## 2025-08-23 DIAGNOSIS — G93.41 SEPSIS WITH ENCEPHALOPATHY WITHOUT SEPTIC SHOCK, DUE TO UNSPECIFIED ORGANISM (HCC): ICD-10-CM

## 2025-08-23 DIAGNOSIS — R65.20 SEPSIS WITH ENCEPHALOPATHY WITHOUT SEPTIC SHOCK, DUE TO UNSPECIFIED ORGANISM (HCC): ICD-10-CM

## 2025-08-23 DIAGNOSIS — N17.9 ACUTE KIDNEY INJURY: ICD-10-CM

## 2025-08-23 DIAGNOSIS — N13.9 ACUTE UNILATERAL OBSTRUCTIVE UROPATHY: ICD-10-CM

## 2025-08-23 DIAGNOSIS — R78.81 BACTEREMIA: Primary | ICD-10-CM

## 2025-08-23 PROBLEM — N13.2 HYDRONEPHROSIS WITH URINARY OBSTRUCTION DUE TO URETERAL CALCULUS: Status: ACTIVE | Noted: 2025-08-23

## 2025-08-23 LAB
ALBUMIN SERPL-MCNC: 3 G/DL (ref 3.4–5)
ALBUMIN/GLOB SERPL: 1 (ref 0.8–1.7)
ALP SERPL-CCNC: 144 U/L (ref 45–117)
ALT SERPL-CCNC: 15 U/L (ref 10–35)
ANION GAP BLD CALC-SCNC: ABNORMAL MMOL/L (ref 10–20)
ANION GAP SERPL CALC-SCNC: 17 MMOL/L (ref 3–18)
APPEARANCE UR: ABNORMAL
AST SERPL-CCNC: 14 U/L (ref 10–38)
BACTERIA URNS QL MICRO: ABNORMAL /HPF
BASE EXCESS BLD CALC-SCNC: 3 MMOL/L
BASOPHILS # BLD: 0.04 K/UL (ref 0–0.1)
BASOPHILS NFR BLD: 0.2 % (ref 0–2)
BILIRUB SERPL-MCNC: 0.4 MG/DL (ref 0.2–1)
BILIRUB UR QL: NEGATIVE
BUN SERPL-MCNC: 30 MG/DL (ref 6–23)
BUN/CREAT SERPL: 12 (ref 12–20)
CA-I BLD-MCNC: 1.04 MMOL/L (ref 1.15–1.33)
CALCIUM SERPL-MCNC: 9.9 MG/DL (ref 8.5–10.1)
CHLORIDE BLD-SCNC: 100 MMOL/L (ref 98–107)
CHLORIDE SERPL-SCNC: 96 MMOL/L (ref 98–107)
CO2 BLD-SCNC: 26 MMOL/L (ref 22–29)
CO2 SERPL-SCNC: 24 MMOL/L (ref 21–32)
COLOR UR: ABNORMAL
CREAT BLD-MCNC: 2.46 MG/DL (ref 0.6–1.3)
CREAT SERPL-MCNC: 2.46 MG/DL (ref 0.6–1.3)
DIFFERENTIAL METHOD BLD: ABNORMAL
EKG ATRIAL RATE: 131 BPM
EKG DIAGNOSIS: NORMAL
EKG P AXIS: 44 DEGREES
EKG P-R INTERVAL: 146 MS
EKG Q-T INTERVAL: 294 MS
EKG QRS DURATION: 100 MS
EKG QTC CALCULATION (BAZETT): 434 MS
EKG R AXIS: 24 DEGREES
EKG T AXIS: 37 DEGREES
EKG VENTRICULAR RATE: 131 BPM
EOSINOPHIL # BLD: 0.02 K/UL (ref 0–0.4)
EOSINOPHIL NFR BLD: 0.1 % (ref 0–5)
ERYTHROCYTE [DISTWIDTH] IN BLOOD BY AUTOMATED COUNT: 15.5 % (ref 11.6–14.5)
FLUAV RNA SPEC QL NAA+PROBE: NOT DETECTED
FLUBV RNA SPEC QL NAA+PROBE: NOT DETECTED
GLOBULIN SER CALC-MCNC: 3.2 G/DL (ref 2–4)
GLUCOSE BLD STRIP.AUTO-MCNC: 155 MG/DL (ref 70–110)
GLUCOSE BLD-MCNC: 130 MG/DL (ref 74–99)
GLUCOSE SERPL-MCNC: 137 MG/DL (ref 74–108)
GLUCOSE UR STRIP.AUTO-MCNC: NEGATIVE MG/DL
HCO3 BLD-SCNC: 27.2 MMOL/L (ref 21–28)
HCT VFR BLD AUTO: 36.8 % (ref 35–45)
HGB BLD-MCNC: 11.8 G/DL (ref 12–16)
HGB UR QL STRIP: ABNORMAL
IMM GRANULOCYTES # BLD AUTO: 0.12 K/UL (ref 0–0.04)
IMM GRANULOCYTES NFR BLD AUTO: 0.6 % (ref 0–0.5)
INR PPP: 1.1 (ref 0.9–1.2)
KETONES UR QL STRIP.AUTO: NEGATIVE MG/DL
LACTATE BLD-SCNC: 1.16 MMOL/L (ref 0.4–2)
LACTATE BLD-SCNC: 3.74 MMOL/L (ref 0.4–2)
LEUKOCYTE ESTERASE UR QL STRIP.AUTO: ABNORMAL
LYMPHOCYTES # BLD: 0.78 K/UL (ref 0.9–3.3)
LYMPHOCYTES NFR BLD: 4 % (ref 21–52)
MCH RBC QN AUTO: 27.5 PG (ref 24–34)
MCHC RBC AUTO-ENTMCNC: 32.1 G/DL (ref 31–37)
MCV RBC AUTO: 85.8 FL (ref 78–100)
MONOCYTES # BLD: 1.09 K/UL (ref 0.05–1.2)
MONOCYTES NFR BLD: 5.6 % (ref 3–10)
NEUTS SEG # BLD: 17.36 K/UL (ref 1.8–8)
NEUTS SEG NFR BLD: 89.5 % (ref 40–73)
NITRITE UR QL STRIP.AUTO: NEGATIVE
NRBC # BLD: 0 K/UL (ref 0–0.01)
NRBC BLD-RTO: 0 PER 100 WBC
PCO2 BLDV: 39.6 MMHG (ref 41–51)
PH BLDV: 7.45 (ref 7.32–7.42)
PH UR STRIP: 8 (ref 5–8)
PLATELET # BLD AUTO: 308 K/UL (ref 135–420)
PMV BLD AUTO: 9.2 FL (ref 9.2–11.8)
PO2 BLDV: 29 MMHG (ref 25–40)
POTASSIUM BLD-SCNC: 6.7 MMOL/L (ref 3.5–5.1)
POTASSIUM SERPL-SCNC: 3.9 MMOL/L (ref 3.5–5.5)
POTASSIUM SERPL-SCNC: 4.7 MMOL/L (ref 3.5–5.5)
PROCALCITONIN SERPL-MCNC: 0.53 NG/ML
PROT SERPL-MCNC: 6.2 G/DL (ref 6.4–8.2)
PROT UR STRIP-MCNC: 300 MG/DL
PROTHROMBIN TIME: 14.6 SEC (ref 12–15.1)
RBC # BLD AUTO: 4.29 M/UL (ref 4.2–5.3)
RBC #/AREA URNS HPF: ABNORMAL /HPF (ref 0–5)
RBC MORPH BLD: ABNORMAL
SAO2 % BLD: 59 % (ref 94–98)
SARS-COV-2 RNA RESP QL NAA+PROBE: NOT DETECTED
SERVICE CMNT-IMP: ABNORMAL
SODIUM BLD-SCNC: 132 MMOL/L (ref 136–145)
SODIUM SERPL-SCNC: 137 MMOL/L (ref 136–145)
SOURCE: NORMAL
SP GR UR REFRACTOMETRY: 1.01 (ref 1–1.03)
SPECIMEN SITE: ABNORMAL
TROPONIN T SERPL HS-MCNC: 25.2 NG/L (ref 0–14)
UROBILINOGEN UR QL STRIP.AUTO: 0.2 EU/DL (ref 0.2–1)
WBC # BLD AUTO: 19.4 K/UL (ref 4.6–13.2)
WBC URNS QL MICRO: ABNORMAL /HPF (ref 0–5)

## 2025-08-23 PROCEDURE — 7100000000 HC PACU RECOVERY - FIRST 15 MIN: Performed by: UROLOGY

## 2025-08-23 PROCEDURE — 74176 CT ABD & PELVIS W/O CONTRAST: CPT

## 2025-08-23 PROCEDURE — 87077 CULTURE AEROBIC IDENTIFY: CPT

## 2025-08-23 PROCEDURE — 82330 ASSAY OF CALCIUM: CPT

## 2025-08-23 PROCEDURE — 3700000001 HC ADD 15 MINUTES (ANESTHESIA): Performed by: UROLOGY

## 2025-08-23 PROCEDURE — 87636 SARSCOV2 & INF A&B AMP PRB: CPT

## 2025-08-23 PROCEDURE — 0TC78ZZ EXTIRPATION OF MATTER FROM LEFT URETER, VIA NATURAL OR ARTIFICIAL OPENING ENDOSCOPIC: ICD-10-PCS | Performed by: UROLOGY

## 2025-08-23 PROCEDURE — 87040 BLOOD CULTURE FOR BACTERIA: CPT

## 2025-08-23 PROCEDURE — 3600000012 HC SURGERY LEVEL 2 ADDTL 15MIN: Performed by: UROLOGY

## 2025-08-23 PROCEDURE — 81001 URINALYSIS AUTO W/SCOPE: CPT

## 2025-08-23 PROCEDURE — 82962 GLUCOSE BLOOD TEST: CPT

## 2025-08-23 PROCEDURE — 84295 ASSAY OF SERUM SODIUM: CPT

## 2025-08-23 PROCEDURE — 85025 COMPLETE CBC W/AUTO DIFF WBC: CPT

## 2025-08-23 PROCEDURE — 2500000003 HC RX 250 WO HCPCS: Performed by: NURSE ANESTHETIST, CERTIFIED REGISTERED

## 2025-08-23 PROCEDURE — 2580000003 HC RX 258: Performed by: NURSE ANESTHETIST, CERTIFIED REGISTERED

## 2025-08-23 PROCEDURE — C1769 GUIDE WIRE: HCPCS | Performed by: UROLOGY

## 2025-08-23 PROCEDURE — 71045 X-RAY EXAM CHEST 1 VIEW: CPT

## 2025-08-23 PROCEDURE — 3700000000 HC ANESTHESIA ATTENDED CARE: Performed by: UROLOGY

## 2025-08-23 PROCEDURE — 2709999900 HC NON-CHARGEABLE SUPPLY: Performed by: UROLOGY

## 2025-08-23 PROCEDURE — 84145 PROCALCITONIN (PCT): CPT

## 2025-08-23 PROCEDURE — 2000000000 HC ICU R&B

## 2025-08-23 PROCEDURE — 6360000002 HC RX W HCPCS: Performed by: ANESTHESIOLOGY

## 2025-08-23 PROCEDURE — 87186 SC STD MICRODIL/AGAR DIL: CPT

## 2025-08-23 PROCEDURE — 87154 CUL TYP ID BLD PTHGN 6+ TRGT: CPT

## 2025-08-23 PROCEDURE — 3600000002 HC SURGERY LEVEL 2 BASE: Performed by: UROLOGY

## 2025-08-23 PROCEDURE — 6360000002 HC RX W HCPCS: Performed by: NURSE ANESTHETIST, CERTIFIED REGISTERED

## 2025-08-23 PROCEDURE — 2500000003 HC RX 250 WO HCPCS: Performed by: ANESTHESIOLOGY

## 2025-08-23 PROCEDURE — 2580000003 HC RX 258: Performed by: STUDENT IN AN ORGANIZED HEALTH CARE EDUCATION/TRAINING PROGRAM

## 2025-08-23 PROCEDURE — 6360000002 HC RX W HCPCS: Performed by: STUDENT IN AN ORGANIZED HEALTH CARE EDUCATION/TRAINING PROGRAM

## 2025-08-23 PROCEDURE — 80053 COMPREHEN METABOLIC PANEL: CPT

## 2025-08-23 PROCEDURE — 87088 URINE BACTERIA CULTURE: CPT

## 2025-08-23 PROCEDURE — 83605 ASSAY OF LACTIC ACID: CPT

## 2025-08-23 PROCEDURE — C2617 STENT, NON-COR, TEM W/O DEL: HCPCS | Performed by: UROLOGY

## 2025-08-23 PROCEDURE — 87086 URINE CULTURE/COLONY COUNT: CPT

## 2025-08-23 PROCEDURE — 84132 ASSAY OF SERUM POTASSIUM: CPT

## 2025-08-23 PROCEDURE — 93005 ELECTROCARDIOGRAM TRACING: CPT | Performed by: STUDENT IN AN ORGANIZED HEALTH CARE EDUCATION/TRAINING PROGRAM

## 2025-08-23 PROCEDURE — 6360000002 HC RX W HCPCS: Performed by: HOSPITALIST

## 2025-08-23 PROCEDURE — C1758 CATHETER, URETERAL: HCPCS | Performed by: UROLOGY

## 2025-08-23 PROCEDURE — BT141ZZ FLUOROSCOPY OF KIDNEYS, URETERS AND BLADDER USING LOW OSMOLAR CONTRAST: ICD-10-PCS | Performed by: UROLOGY

## 2025-08-23 PROCEDURE — 99285 EMERGENCY DEPT VISIT HI MDM: CPT

## 2025-08-23 PROCEDURE — 82947 ASSAY GLUCOSE BLOOD QUANT: CPT

## 2025-08-23 PROCEDURE — 0TC68ZZ EXTIRPATION OF MATTER FROM RIGHT URETER, VIA NATURAL OR ARTIFICIAL OPENING ENDOSCOPIC: ICD-10-PCS | Performed by: UROLOGY

## 2025-08-23 PROCEDURE — 2500000003 HC RX 250 WO HCPCS: Performed by: HOSPITALIST

## 2025-08-23 PROCEDURE — 70450 CT HEAD/BRAIN W/O DYE: CPT

## 2025-08-23 PROCEDURE — 96365 THER/PROPH/DIAG IV INF INIT: CPT

## 2025-08-23 PROCEDURE — 85014 HEMATOCRIT: CPT

## 2025-08-23 PROCEDURE — 74420 UROGRAPHY RTRGR +-KUB: CPT

## 2025-08-23 PROCEDURE — 84484 ASSAY OF TROPONIN QUANT: CPT

## 2025-08-23 PROCEDURE — C2627 CATH, SUPRAPUBIC/CYSTOSCOPIC: HCPCS | Performed by: UROLOGY

## 2025-08-23 PROCEDURE — 85610 PROTHROMBIN TIME: CPT

## 2025-08-23 PROCEDURE — 36415 COLL VENOUS BLD VENIPUNCTURE: CPT

## 2025-08-23 PROCEDURE — 96366 THER/PROPH/DIAG IV INF ADDON: CPT

## 2025-08-23 PROCEDURE — 7100000001 HC PACU RECOVERY - ADDTL 15 MIN: Performed by: UROLOGY

## 2025-08-23 PROCEDURE — 2580000003 HC RX 258: Performed by: HOSPITALIST

## 2025-08-23 PROCEDURE — 93010 ELECTROCARDIOGRAM REPORT: CPT | Performed by: INTERNAL MEDICINE

## 2025-08-23 PROCEDURE — 6360000004 HC RX CONTRAST MEDICATION: Performed by: UROLOGY

## 2025-08-23 PROCEDURE — 82803 BLOOD GASES ANY COMBINATION: CPT

## 2025-08-23 PROCEDURE — 0T788DZ DILATION OF BILATERAL URETERS WITH INTRALUMINAL DEVICE, VIA NATURAL OR ARTIFICIAL OPENING ENDOSCOPIC: ICD-10-PCS | Performed by: UROLOGY

## 2025-08-23 DEVICE — IMPLANTABLE DEVICE: Type: IMPLANTABLE DEVICE | Site: URETER | Status: FUNCTIONAL

## 2025-08-23 DEVICE — STENT URET 6FR L28CM PERCFLX HYDR+ DBL PGTL THRD 2: Type: IMPLANTABLE DEVICE | Site: URETER | Status: FUNCTIONAL

## 2025-08-23 RX ORDER — HEPARIN SODIUM 5000 [USP'U]/ML
5000 INJECTION, SOLUTION INTRAVENOUS; SUBCUTANEOUS EVERY 8 HOURS SCHEDULED
Status: DISCONTINUED | OUTPATIENT
Start: 2025-08-23 | End: 2025-08-27

## 2025-08-23 RX ORDER — FENTANYL CITRATE 50 UG/ML
INJECTION, SOLUTION INTRAMUSCULAR; INTRAVENOUS
Status: DISCONTINUED | OUTPATIENT
Start: 2025-08-23 | End: 2025-08-23 | Stop reason: SDUPTHER

## 2025-08-23 RX ORDER — LIDOCAINE HYDROCHLORIDE 20 MG/ML
INJECTION, SOLUTION EPIDURAL; INFILTRATION; INTRACAUDAL; PERINEURAL
Status: DISCONTINUED | OUTPATIENT
Start: 2025-08-23 | End: 2025-08-23 | Stop reason: SDUPTHER

## 2025-08-23 RX ORDER — IPRATROPIUM BROMIDE AND ALBUTEROL SULFATE 2.5; .5 MG/3ML; MG/3ML
1 SOLUTION RESPIRATORY (INHALATION)
Status: DISCONTINUED | OUTPATIENT
Start: 2025-08-23 | End: 2025-08-23 | Stop reason: HOSPADM

## 2025-08-23 RX ORDER — DROPERIDOL 2.5 MG/ML
0.62 INJECTION, SOLUTION INTRAMUSCULAR; INTRAVENOUS
Status: DISCONTINUED | OUTPATIENT
Start: 2025-08-23 | End: 2025-08-23 | Stop reason: HOSPADM

## 2025-08-23 RX ORDER — ONDANSETRON 2 MG/ML
4 INJECTION INTRAMUSCULAR; INTRAVENOUS
Status: DISCONTINUED | OUTPATIENT
Start: 2025-08-23 | End: 2025-08-23 | Stop reason: HOSPADM

## 2025-08-23 RX ORDER — HYDROMORPHONE HYDROCHLORIDE 1 MG/ML
0.25 INJECTION, SOLUTION INTRAMUSCULAR; INTRAVENOUS; SUBCUTANEOUS EVERY 5 MIN PRN
Status: DISCONTINUED | OUTPATIENT
Start: 2025-08-23 | End: 2025-08-23 | Stop reason: HOSPADM

## 2025-08-23 RX ORDER — SODIUM CHLORIDE 0.9 % (FLUSH) 0.9 %
5-40 SYRINGE (ML) INJECTION PRN
Status: DISCONTINUED | OUTPATIENT
Start: 2025-08-23 | End: 2025-08-23 | Stop reason: HOSPADM

## 2025-08-23 RX ORDER — IOPAMIDOL 612 MG/ML
INJECTION, SOLUTION INTRAVASCULAR PRN
Status: DISCONTINUED | OUTPATIENT
Start: 2025-08-23 | End: 2025-08-23 | Stop reason: ALTCHOICE

## 2025-08-23 RX ORDER — IOPAMIDOL 612 MG/ML
INJECTION, SOLUTION INTRATHECAL PRN
Status: DISCONTINUED | OUTPATIENT
Start: 2025-08-23 | End: 2025-08-23 | Stop reason: ALTCHOICE

## 2025-08-23 RX ORDER — TRAZODONE HYDROCHLORIDE 50 MG/1
50 TABLET ORAL NIGHTLY
Status: DISCONTINUED | OUTPATIENT
Start: 2025-08-23 | End: 2025-08-29 | Stop reason: HOSPADM

## 2025-08-23 RX ORDER — DULOXETIN HYDROCHLORIDE 60 MG/1
60 CAPSULE, DELAYED RELEASE ORAL DAILY
Status: DISCONTINUED | OUTPATIENT
Start: 2025-08-23 | End: 2025-08-29 | Stop reason: HOSPADM

## 2025-08-23 RX ORDER — ONDANSETRON 2 MG/ML
INJECTION INTRAMUSCULAR; INTRAVENOUS
Status: DISCONTINUED | OUTPATIENT
Start: 2025-08-23 | End: 2025-08-23 | Stop reason: SDUPTHER

## 2025-08-23 RX ORDER — SODIUM CHLORIDE, SODIUM LACTATE, POTASSIUM CHLORIDE, CALCIUM CHLORIDE 600; 310; 30; 20 MG/100ML; MG/100ML; MG/100ML; MG/100ML
INJECTION, SOLUTION INTRAVENOUS
Status: DISCONTINUED | OUTPATIENT
Start: 2025-08-23 | End: 2025-08-23 | Stop reason: SDUPTHER

## 2025-08-23 RX ORDER — ROCURONIUM BROMIDE 10 MG/ML
INJECTION, SOLUTION INTRAVENOUS
Status: DISCONTINUED | OUTPATIENT
Start: 2025-08-23 | End: 2025-08-23 | Stop reason: SDUPTHER

## 2025-08-23 RX ORDER — FENTANYL CITRATE 50 UG/ML
25 INJECTION, SOLUTION INTRAMUSCULAR; INTRAVENOUS EVERY 5 MIN PRN
Status: DISCONTINUED | OUTPATIENT
Start: 2025-08-23 | End: 2025-08-23 | Stop reason: HOSPADM

## 2025-08-23 RX ORDER — LABETALOL HYDROCHLORIDE 5 MG/ML
10 INJECTION, SOLUTION INTRAVENOUS
Status: DISCONTINUED | OUTPATIENT
Start: 2025-08-23 | End: 2025-08-23 | Stop reason: HOSPADM

## 2025-08-23 RX ORDER — CLOPIDOGREL BISULFATE 75 MG/1
75 TABLET ORAL EVERY MORNING
Status: DISCONTINUED | OUTPATIENT
Start: 2025-08-24 | End: 2025-08-29 | Stop reason: HOSPADM

## 2025-08-23 RX ORDER — SODIUM CHLORIDE 9 MG/ML
INJECTION, SOLUTION INTRAVENOUS
Status: DISCONTINUED | OUTPATIENT
Start: 2025-08-23 | End: 2025-08-23 | Stop reason: SDUPTHER

## 2025-08-23 RX ORDER — PROPOFOL 10 MG/ML
INJECTION, EMULSION INTRAVENOUS
Status: DISCONTINUED | OUTPATIENT
Start: 2025-08-23 | End: 2025-08-23 | Stop reason: SDUPTHER

## 2025-08-23 RX ORDER — AMITRIPTYLINE HYDROCHLORIDE 50 MG/1
50 TABLET ORAL NIGHTLY
Status: DISCONTINUED | OUTPATIENT
Start: 2025-08-23 | End: 2025-08-29 | Stop reason: HOSPADM

## 2025-08-23 RX ORDER — CARBIDOPA AND LEVODOPA 25; 100 MG/1; MG/1
1 TABLET ORAL 2 TIMES DAILY
Status: DISCONTINUED | OUTPATIENT
Start: 2025-08-23 | End: 2025-08-29 | Stop reason: HOSPADM

## 2025-08-23 RX ORDER — SODIUM CHLORIDE, SODIUM LACTATE, POTASSIUM CHLORIDE, CALCIUM CHLORIDE 600; 310; 30; 20 MG/100ML; MG/100ML; MG/100ML; MG/100ML
INJECTION, SOLUTION INTRAVENOUS CONTINUOUS
Status: DISCONTINUED | OUTPATIENT
Start: 2025-08-23 | End: 2025-08-23 | Stop reason: HOSPADM

## 2025-08-23 RX ORDER — PHENYLEPHRINE HCL IN 0.9% NACL 1 MG/10 ML
SYRINGE (ML) INTRAVENOUS
Status: DISCONTINUED | OUTPATIENT
Start: 2025-08-23 | End: 2025-08-23 | Stop reason: SDUPTHER

## 2025-08-23 RX ORDER — ATORVASTATIN CALCIUM 20 MG/1
20 TABLET, FILM COATED ORAL NIGHTLY
Status: DISCONTINUED | OUTPATIENT
Start: 2025-08-23 | End: 2025-08-29 | Stop reason: HOSPADM

## 2025-08-23 RX ORDER — SODIUM CHLORIDE 0.9 % (FLUSH) 0.9 %
5-40 SYRINGE (ML) INJECTION EVERY 12 HOURS SCHEDULED
Status: DISCONTINUED | OUTPATIENT
Start: 2025-08-23 | End: 2025-08-23 | Stop reason: HOSPADM

## 2025-08-23 RX ORDER — SODIUM CHLORIDE 9 MG/ML
INJECTION, SOLUTION INTRAVENOUS PRN
Status: DISCONTINUED | OUTPATIENT
Start: 2025-08-23 | End: 2025-08-23 | Stop reason: HOSPADM

## 2025-08-23 RX ORDER — SODIUM CHLORIDE, SODIUM LACTATE, POTASSIUM CHLORIDE, CALCIUM CHLORIDE 600; 310; 30; 20 MG/100ML; MG/100ML; MG/100ML; MG/100ML
INJECTION, SOLUTION INTRAVENOUS CONTINUOUS
Status: DISCONTINUED | OUTPATIENT
Start: 2025-08-23 | End: 2025-08-29

## 2025-08-23 RX ORDER — DEXAMETHASONE SODIUM PHOSPHATE 4 MG/ML
INJECTION, SOLUTION INTRA-ARTICULAR; INTRALESIONAL; INTRAMUSCULAR; INTRAVENOUS; SOFT TISSUE
Status: DISCONTINUED | OUTPATIENT
Start: 2025-08-23 | End: 2025-08-23 | Stop reason: SDUPTHER

## 2025-08-23 RX ORDER — FUROSEMIDE 20 MG/1
10 TABLET ORAL DAILY
Status: ON HOLD | COMMUNITY
End: 2025-08-29 | Stop reason: HOSPADM

## 2025-08-23 RX ORDER — DIPHENHYDRAMINE HYDROCHLORIDE 50 MG/ML
12.5 INJECTION, SOLUTION INTRAMUSCULAR; INTRAVENOUS
Status: DISCONTINUED | OUTPATIENT
Start: 2025-08-23 | End: 2025-08-23 | Stop reason: HOSPADM

## 2025-08-23 RX ORDER — 0.9 % SODIUM CHLORIDE 0.9 %
30 INTRAVENOUS SOLUTION INTRAVENOUS ONCE
Status: COMPLETED | OUTPATIENT
Start: 2025-08-23 | End: 2025-08-24

## 2025-08-23 RX ADMIN — SODIUM CHLORIDE, SODIUM LACTATE, POTASSIUM CHLORIDE, AND CALCIUM CHLORIDE: 600; 310; 30; 20 INJECTION, SOLUTION INTRAVENOUS at 20:38

## 2025-08-23 RX ADMIN — Medication 100 MCG: at 16:26

## 2025-08-23 RX ADMIN — FENTANYL CITRATE 25 MCG: 50 INJECTION INTRAMUSCULAR; INTRAVENOUS at 16:37

## 2025-08-23 RX ADMIN — SODIUM CHLORIDE, SODIUM LACTATE, POTASSIUM CHLORIDE, AND CALCIUM CHLORIDE: 600; 310; 30; 20 INJECTION, SOLUTION INTRAVENOUS at 16:49

## 2025-08-23 RX ADMIN — HEPARIN SODIUM 5000 UNITS: 5000 INJECTION, SOLUTION INTRAVENOUS; SUBCUTANEOUS at 22:23

## 2025-08-23 RX ADMIN — WATER 1000 MG: 1 INJECTION INTRAMUSCULAR; INTRAVENOUS; SUBCUTANEOUS at 20:34

## 2025-08-23 RX ADMIN — LIDOCAINE HYDROCHLORIDE 90 MG: 20 INJECTION, SOLUTION EPIDURAL; INFILTRATION; INTRACAUDAL; PERINEURAL at 15:57

## 2025-08-23 RX ADMIN — ONDANSETRON 4 MG: 2 INJECTION, SOLUTION INTRAMUSCULAR; INTRAVENOUS at 16:48

## 2025-08-23 RX ADMIN — PROPOFOL 100 MG: 10 INJECTION, EMULSION INTRAVENOUS at 15:57

## 2025-08-23 RX ADMIN — FENTANYL CITRATE 25 MCG: 50 INJECTION INTRAMUSCULAR; INTRAVENOUS at 16:48

## 2025-08-23 RX ADMIN — SUGAMMADEX 140 MG: 100 INJECTION, SOLUTION INTRAVENOUS at 16:49

## 2025-08-23 RX ADMIN — SODIUM CHLORIDE 2040 ML: 900 INJECTION, SOLUTION INTRAVENOUS at 11:40

## 2025-08-23 RX ADMIN — Medication 100 MCG: at 16:19

## 2025-08-23 RX ADMIN — ROCURONIUM BROMIDE 40 MG: 10 INJECTION, SOLUTION INTRAVENOUS at 15:58

## 2025-08-23 RX ADMIN — FENTANYL CITRATE 50 MCG: 50 INJECTION INTRAMUSCULAR; INTRAVENOUS at 15:57

## 2025-08-23 RX ADMIN — Medication 100 MCG: at 16:43

## 2025-08-23 RX ADMIN — SODIUM CHLORIDE: 900 INJECTION, SOLUTION INTRAVENOUS at 15:35

## 2025-08-23 RX ADMIN — DEXAMETHASONE SODIUM PHOSPHATE 4 MG: 4 INJECTION INTRA-ARTICULAR; INTRALESIONAL; INTRAMUSCULAR; INTRAVENOUS; SOFT TISSUE at 16:13

## 2025-08-23 RX ADMIN — Medication 100 MCG: at 16:27

## 2025-08-23 RX ADMIN — CEFEPIME 2000 MG: 2 INJECTION, POWDER, FOR SOLUTION INTRAVENOUS at 11:39

## 2025-08-23 ASSESSMENT — PAIN SCALES - GENERAL
PAINLEVEL_OUTOF10: 0

## 2025-08-23 ASSESSMENT — LIFESTYLE VARIABLES: SMOKING_STATUS: 0

## 2025-08-24 LAB
ACB COMPLEX DNA BLD POS QL NAA+NON-PROBE: NOT DETECTED
ACCESSION NUMBER, LLC1M: ABNORMAL
ALBUMIN SERPL-MCNC: 2.1 G/DL (ref 3.4–5)
ALBUMIN/GLOB SERPL: 0.6 (ref 0.8–1.7)
ALP SERPL-CCNC: 104 U/L (ref 45–117)
ALT SERPL-CCNC: 11 U/L (ref 10–35)
ANION GAP SERPL CALC-SCNC: 11 MMOL/L (ref 3–18)
AST SERPL-CCNC: 8 U/L (ref 10–38)
B FRAGILIS DNA BLD POS QL NAA+NON-PROBE: NOT DETECTED
BASOPHILS # BLD: 0.02 K/UL (ref 0–0.1)
BASOPHILS NFR BLD: 0.1 % (ref 0–2)
BILIRUB SERPL-MCNC: <0.2 MG/DL (ref 0.2–1)
BIOFIRE TEST COMMENT: ABNORMAL
BUN SERPL-MCNC: 25 MG/DL (ref 6–23)
BUN/CREAT SERPL: 16 (ref 12–20)
C ALBICANS DNA BLD POS QL NAA+NON-PROBE: NOT DETECTED
C AURIS DNA BLD POS QL NAA+NON-PROBE: NOT DETECTED
C GATTII+NEOFOR DNA BLD POS QL NAA+N-PRB: NOT DETECTED
C GLABRATA DNA BLD POS QL NAA+NON-PROBE: NOT DETECTED
C KRUSEI DNA BLD POS QL NAA+NON-PROBE: NOT DETECTED
C PARAP DNA BLD POS QL NAA+NON-PROBE: NOT DETECTED
C TROPICLS DNA BLD POS QL NAA+NON-PROBE: NOT DETECTED
CALCIUM SERPL-MCNC: 8.4 MG/DL (ref 8.5–10.1)
CHLORIDE SERPL-SCNC: 106 MMOL/L (ref 98–107)
CO2 SERPL-SCNC: 22 MMOL/L (ref 21–32)
CREAT SERPL-MCNC: 1.55 MG/DL (ref 0.6–1.3)
DIFFERENTIAL METHOD BLD: ABNORMAL
E CLOAC COMP DNA BLD POS NAA+NON-PROBE: NOT DETECTED
E COLI DNA BLD POS QL NAA+NON-PROBE: NOT DETECTED
E FAECALIS DNA BLD POS QL NAA+NON-PROBE: NOT DETECTED
E FAECIUM DNA BLD POS QL NAA+NON-PROBE: NOT DETECTED
ENTEROBACTERALES DNA BLD POS NAA+N-PRB: NOT DETECTED
EOSINOPHIL # BLD: 0 K/UL (ref 0–0.4)
EOSINOPHIL NFR BLD: 0 % (ref 0–5)
ERYTHROCYTE [DISTWIDTH] IN BLOOD BY AUTOMATED COUNT: 15.4 % (ref 11.6–14.5)
GLOBULIN SER CALC-MCNC: 3.4 G/DL (ref 2–4)
GLUCOSE BLD STRIP.AUTO-MCNC: 106 MG/DL (ref 70–110)
GLUCOSE SERPL-MCNC: 121 MG/DL (ref 74–108)
GP B STREP DNA BLD POS QL NAA+NON-PROBE: NOT DETECTED
HAEM INFLU DNA BLD POS QL NAA+NON-PROBE: NOT DETECTED
HCT VFR BLD AUTO: 30.3 % (ref 35–45)
HGB BLD-MCNC: 9.5 G/DL (ref 12–16)
IMM GRANULOCYTES # BLD AUTO: 0.09 K/UL (ref 0–0.04)
IMM GRANULOCYTES NFR BLD AUTO: 0.6 % (ref 0–0.5)
K OXYTOCA DNA BLD POS QL NAA+NON-PROBE: NOT DETECTED
KLEBSIELLA SP DNA BLD POS QL NAA+NON-PRB: NOT DETECTED
KLEBSIELLA SP DNA BLD POS QL NAA+NON-PRB: NOT DETECTED
L MONOCYTOG DNA BLD POS QL NAA+NON-PROBE: NOT DETECTED
LYMPHOCYTES # BLD: 1.2 K/UL (ref 0.9–3.3)
LYMPHOCYTES NFR BLD: 8 % (ref 21–52)
MAGNESIUM SERPL-MCNC: 2.2 MG/DL (ref 1.6–2.6)
MCH RBC QN AUTO: 27.7 PG (ref 24–34)
MCHC RBC AUTO-ENTMCNC: 31.4 G/DL (ref 31–37)
MCV RBC AUTO: 88.3 FL (ref 78–100)
MECA+MECC+MREJ ISLT/SPM QL: DETECTED
MONOCYTES # BLD: 0.88 K/UL (ref 0.05–1.2)
MONOCYTES NFR BLD: 5.8 % (ref 3–10)
N MEN DNA BLD POS QL NAA+NON-PROBE: NOT DETECTED
NEUTS SEG # BLD: 12.87 K/UL (ref 1.8–8)
NEUTS SEG NFR BLD: 85.5 % (ref 40–73)
NRBC # BLD: 0 K/UL (ref 0–0.01)
NRBC BLD-RTO: 0 PER 100 WBC
P AERUGINOSA DNA BLD POS NAA+NON-PROBE: NOT DETECTED
PLATELET # BLD AUTO: 249 K/UL (ref 135–420)
PMV BLD AUTO: 9.3 FL (ref 9.2–11.8)
POTASSIUM SERPL-SCNC: 4.3 MMOL/L (ref 3.5–5.5)
PROCALCITONIN SERPL-MCNC: 1.25 NG/ML
PROT SERPL-MCNC: 5.5 G/DL (ref 6.4–8.2)
PROTEUS SP DNA BLD POS QL NAA+NON-PROBE: NOT DETECTED
RBC # BLD AUTO: 3.43 M/UL (ref 4.2–5.3)
RESISTANT GENE TARGETS: ABNORMAL
S AUREUS DNA BLD POS QL NAA+NON-PROBE: DETECTED
S AUREUS+CONS DNA BLD POS NAA+NON-PROBE: DETECTED
S EPIDERMIDIS DNA BLD POS QL NAA+NON-PRB: NOT DETECTED
S LUGDUNENSIS DNA BLD POS QL NAA+NON-PRB: NOT DETECTED
S MALTOPHILIA DNA BLD POS QL NAA+NON-PRB: NOT DETECTED
S MARCESCENS DNA BLD POS NAA+NON-PROBE: NOT DETECTED
S PNEUM DNA BLD POS QL NAA+NON-PROBE: NOT DETECTED
S PYO DNA BLD POS QL NAA+NON-PROBE: NOT DETECTED
SALMONELLA DNA BLD POS QL NAA+NON-PROBE: NOT DETECTED
SODIUM SERPL-SCNC: 139 MMOL/L (ref 136–145)
STREPTOCOCCUS DNA BLD POS NAA+NON-PROBE: NOT DETECTED
WBC # BLD AUTO: 15.1 K/UL (ref 4.6–13.2)

## 2025-08-24 PROCEDURE — 97162 PT EVAL MOD COMPLEX 30 MIN: CPT

## 2025-08-24 PROCEDURE — 6360000002 HC RX W HCPCS: Performed by: HOSPITALIST

## 2025-08-24 PROCEDURE — 85025 COMPLETE CBC W/AUTO DIFF WBC: CPT

## 2025-08-24 PROCEDURE — 6370000000 HC RX 637 (ALT 250 FOR IP): Performed by: HOSPITALIST

## 2025-08-24 PROCEDURE — 82962 GLUCOSE BLOOD TEST: CPT

## 2025-08-24 PROCEDURE — 80053 COMPREHEN METABOLIC PANEL: CPT

## 2025-08-24 PROCEDURE — 83735 ASSAY OF MAGNESIUM: CPT

## 2025-08-24 PROCEDURE — 36415 COLL VENOUS BLD VENIPUNCTURE: CPT

## 2025-08-24 PROCEDURE — 97166 OT EVAL MOD COMPLEX 45 MIN: CPT

## 2025-08-24 PROCEDURE — 2580000003 HC RX 258: Performed by: HOSPITALIST

## 2025-08-24 PROCEDURE — 84145 PROCALCITONIN (PCT): CPT

## 2025-08-24 PROCEDURE — 2500000003 HC RX 250 WO HCPCS: Performed by: HOSPITALIST

## 2025-08-24 PROCEDURE — 92610 EVALUATE SWALLOWING FUNCTION: CPT

## 2025-08-24 PROCEDURE — 1100000003 HC PRIVATE W/ TELEMETRY

## 2025-08-24 PROCEDURE — 92526 ORAL FUNCTION THERAPY: CPT

## 2025-08-24 RX ORDER — BUTALBITAL, ACETAMINOPHEN AND CAFFEINE 50; 325; 40 MG/1; MG/1; MG/1
1 TABLET ORAL EVERY 6 HOURS PRN
Status: DISCONTINUED | OUTPATIENT
Start: 2025-08-24 | End: 2025-08-29 | Stop reason: HOSPADM

## 2025-08-24 RX ORDER — ACETAMINOPHEN 325 MG/1
650 TABLET ORAL EVERY 4 HOURS PRN
Status: DISCONTINUED | OUTPATIENT
Start: 2025-08-24 | End: 2025-08-29 | Stop reason: HOSPADM

## 2025-08-24 RX ADMIN — CARBIDOPA AND LEVODOPA 1 TABLET: 25; 100 TABLET ORAL at 21:40

## 2025-08-24 RX ADMIN — TRAZODONE HYDROCHLORIDE 50 MG: 50 TABLET ORAL at 21:40

## 2025-08-24 RX ADMIN — SODIUM CHLORIDE, SODIUM LACTATE, POTASSIUM CHLORIDE, AND CALCIUM CHLORIDE: 600; 310; 30; 20 INJECTION, SOLUTION INTRAVENOUS at 06:48

## 2025-08-24 RX ADMIN — DULOXETINE 60 MG: 60 CAPSULE, DELAYED RELEASE ORAL at 08:28

## 2025-08-24 RX ADMIN — HEPARIN SODIUM 5000 UNITS: 5000 INJECTION, SOLUTION INTRAVENOUS; SUBCUTANEOUS at 15:48

## 2025-08-24 RX ADMIN — HEPARIN SODIUM 5000 UNITS: 5000 INJECTION, SOLUTION INTRAVENOUS; SUBCUTANEOUS at 05:59

## 2025-08-24 RX ADMIN — CLOPIDOGREL BISULFATE 75 MG: 75 TABLET, FILM COATED ORAL at 08:28

## 2025-08-24 RX ADMIN — ATORVASTATIN CALCIUM 20 MG: 20 TABLET, FILM COATED ORAL at 21:40

## 2025-08-24 RX ADMIN — WATER 1000 MG: 1 INJECTION INTRAMUSCULAR; INTRAVENOUS; SUBCUTANEOUS at 21:41

## 2025-08-24 RX ADMIN — CARBIDOPA AND LEVODOPA 1 TABLET: 25; 100 TABLET ORAL at 08:28

## 2025-08-24 RX ADMIN — HEPARIN SODIUM 5000 UNITS: 5000 INJECTION, SOLUTION INTRAVENOUS; SUBCUTANEOUS at 21:41

## 2025-08-24 RX ADMIN — AMITRIPTYLINE HYDROCHLORIDE 50 MG: 50 TABLET ORAL at 21:41

## 2025-08-24 RX ADMIN — ACETAMINOPHEN 650 MG: 325 TABLET ORAL at 16:19

## 2025-08-24 ASSESSMENT — PAIN DESCRIPTION - DESCRIPTORS: DESCRIPTORS: ACHING

## 2025-08-24 ASSESSMENT — PAIN SCALES - GENERAL
PAINLEVEL_OUTOF10: 0
PAINLEVEL_OUTOF10: 3
PAINLEVEL_OUTOF10: 0
PAINLEVEL_OUTOF10: 5
PAINLEVEL_OUTOF10: 0

## 2025-08-24 ASSESSMENT — PAIN DESCRIPTION - LOCATION: LOCATION: HEAD

## 2025-08-24 ASSESSMENT — PAIN - FUNCTIONAL ASSESSMENT
PAIN_FUNCTIONAL_ASSESSMENT: 0-10
PAIN_FUNCTIONAL_ASSESSMENT: 0-10

## 2025-08-24 ASSESSMENT — PAIN DESCRIPTION - ORIENTATION: ORIENTATION: MID

## 2025-08-25 PROBLEM — N13.30 HYDRONEPHROSIS: Status: ACTIVE | Noted: 2025-08-25

## 2025-08-25 PROBLEM — G35 MULTIPLE SCLEROSIS (HCC): Status: ACTIVE | Noted: 2025-08-25

## 2025-08-25 PROBLEM — Z51.5 ENCOUNTER FOR PALLIATIVE CARE: Status: ACTIVE | Noted: 2025-08-25

## 2025-08-25 PROBLEM — Z71.89 GOALS OF CARE, COUNSELING/DISCUSSION: Status: ACTIVE | Noted: 2025-08-25

## 2025-08-25 PROBLEM — R53.81 DEBILITY: Status: ACTIVE | Noted: 2025-08-25

## 2025-08-25 LAB
ALBUMIN SERPL-MCNC: 2.3 G/DL (ref 3.4–5)
ALBUMIN/GLOB SERPL: 0.8 (ref 0.8–1.7)
ALP SERPL-CCNC: 107 U/L (ref 45–117)
ALT SERPL-CCNC: <5 U/L (ref 10–35)
ANION GAP SERPL CALC-SCNC: 14 MMOL/L (ref 3–18)
AST SERPL-CCNC: 13 U/L (ref 10–38)
BACTERIA SPEC CULT: NORMAL
BASOPHILS # BLD: 0.03 K/UL (ref 0–0.1)
BASOPHILS NFR BLD: 0.3 % (ref 0–2)
BILIRUB SERPL-MCNC: <0.2 MG/DL (ref 0.2–1)
BUN SERPL-MCNC: 23 MG/DL (ref 6–23)
BUN/CREAT SERPL: 19 (ref 12–20)
CALCIUM SERPL-MCNC: 8.6 MG/DL (ref 8.5–10.1)
CC UR VC: NORMAL
CHLORIDE SERPL-SCNC: 105 MMOL/L (ref 98–107)
CO2 SERPL-SCNC: 21 MMOL/L (ref 21–32)
CREAT SERPL-MCNC: 1.18 MG/DL (ref 0.6–1.3)
DIFFERENTIAL METHOD BLD: ABNORMAL
EOSINOPHIL # BLD: 0.1 K/UL (ref 0–0.4)
EOSINOPHIL NFR BLD: 0.9 % (ref 0–5)
ERYTHROCYTE [DISTWIDTH] IN BLOOD BY AUTOMATED COUNT: 15.5 % (ref 11.6–14.5)
GLOBULIN SER CALC-MCNC: 2.9 G/DL (ref 2–4)
GLUCOSE SERPL-MCNC: 102 MG/DL (ref 74–108)
HCT VFR BLD AUTO: 29.6 % (ref 35–45)
HGB BLD-MCNC: 9.3 G/DL (ref 12–16)
IMM GRANULOCYTES # BLD AUTO: 0.06 K/UL (ref 0–0.04)
IMM GRANULOCYTES NFR BLD AUTO: 0.6 % (ref 0–0.5)
LYMPHOCYTES # BLD: 1.99 K/UL (ref 0.9–3.3)
LYMPHOCYTES NFR BLD: 18.4 % (ref 21–52)
MAGNESIUM SERPL-MCNC: 2.1 MG/DL (ref 1.6–2.6)
MCH RBC QN AUTO: 27.1 PG (ref 24–34)
MCHC RBC AUTO-ENTMCNC: 31.4 G/DL (ref 31–37)
MCV RBC AUTO: 86.3 FL (ref 78–100)
MONOCYTES # BLD: 0.79 K/UL (ref 0.05–1.2)
MONOCYTES NFR BLD: 7.3 % (ref 3–10)
NEUTS SEG # BLD: 7.83 K/UL (ref 1.8–8)
NEUTS SEG NFR BLD: 72.5 % (ref 40–73)
NRBC # BLD: 0 K/UL (ref 0–0.01)
NRBC BLD-RTO: 0 PER 100 WBC
PLATELET # BLD AUTO: 245 K/UL (ref 135–420)
PMV BLD AUTO: 9.5 FL (ref 9.2–11.8)
POTASSIUM SERPL-SCNC: 4.1 MMOL/L (ref 3.5–5.5)
PROT SERPL-MCNC: 5.3 G/DL (ref 6.4–8.2)
RBC # BLD AUTO: 3.43 M/UL (ref 4.2–5.3)
SERVICE CMNT-IMP: NORMAL
SODIUM SERPL-SCNC: 140 MMOL/L (ref 136–145)
WBC # BLD AUTO: 10.8 K/UL (ref 4.6–13.2)

## 2025-08-25 PROCEDURE — 1100000003 HC PRIVATE W/ TELEMETRY

## 2025-08-25 PROCEDURE — 6360000002 HC RX W HCPCS: Performed by: HOSPITALIST

## 2025-08-25 PROCEDURE — 97535 SELF CARE MNGMENT TRAINING: CPT

## 2025-08-25 PROCEDURE — 2580000003 HC RX 258: Performed by: INTERNAL MEDICINE

## 2025-08-25 PROCEDURE — 2580000003 HC RX 258: Performed by: HOSPITALIST

## 2025-08-25 PROCEDURE — 6370000000 HC RX 637 (ALT 250 FOR IP): Performed by: HOSPITALIST

## 2025-08-25 PROCEDURE — 80053 COMPREHEN METABOLIC PANEL: CPT

## 2025-08-25 PROCEDURE — 97530 THERAPEUTIC ACTIVITIES: CPT

## 2025-08-25 PROCEDURE — 6360000002 HC RX W HCPCS: Performed by: INTERNAL MEDICINE

## 2025-08-25 PROCEDURE — 36415 COLL VENOUS BLD VENIPUNCTURE: CPT

## 2025-08-25 PROCEDURE — 83735 ASSAY OF MAGNESIUM: CPT

## 2025-08-25 PROCEDURE — 85025 COMPLETE CBC W/AUTO DIFF WBC: CPT

## 2025-08-25 PROCEDURE — 99223 1ST HOSP IP/OBS HIGH 75: CPT | Performed by: NURSE PRACTITIONER

## 2025-08-25 PROCEDURE — 92526 ORAL FUNCTION THERAPY: CPT

## 2025-08-25 PROCEDURE — 2500000003 HC RX 250 WO HCPCS: Performed by: HOSPITALIST

## 2025-08-25 PROCEDURE — 6370000000 HC RX 637 (ALT 250 FOR IP): Performed by: INTERNAL MEDICINE

## 2025-08-25 RX ORDER — CALCIUM CARBONATE 500 MG/1
500 TABLET, CHEWABLE ORAL 3 TIMES DAILY PRN
Status: DISCONTINUED | OUTPATIENT
Start: 2025-08-25 | End: 2025-08-29 | Stop reason: HOSPADM

## 2025-08-25 RX ORDER — ZOLPIDEM TARTRATE 5 MG/1
5 TABLET ORAL NIGHTLY PRN
Status: DISCONTINUED | OUTPATIENT
Start: 2025-08-25 | End: 2025-08-29 | Stop reason: HOSPADM

## 2025-08-25 RX ADMIN — WATER 1000 MG: 1 INJECTION INTRAMUSCULAR; INTRAVENOUS; SUBCUTANEOUS at 20:12

## 2025-08-25 RX ADMIN — HEPARIN SODIUM 5000 UNITS: 5000 INJECTION, SOLUTION INTRAVENOUS; SUBCUTANEOUS at 07:19

## 2025-08-25 RX ADMIN — AMITRIPTYLINE HYDROCHLORIDE 50 MG: 50 TABLET ORAL at 20:13

## 2025-08-25 RX ADMIN — CARBIDOPA AND LEVODOPA 1 TABLET: 25; 100 TABLET ORAL at 08:40

## 2025-08-25 RX ADMIN — BUTALBITAL, ACETAMINOPHEN, AND CAFFEINE 1 TABLET: 50; 325; 40 TABLET ORAL at 12:59

## 2025-08-25 RX ADMIN — CARBIDOPA AND LEVODOPA 1 TABLET: 25; 100 TABLET ORAL at 20:13

## 2025-08-25 RX ADMIN — DULOXETINE 60 MG: 60 CAPSULE, DELAYED RELEASE ORAL at 08:40

## 2025-08-25 RX ADMIN — HEPARIN SODIUM 5000 UNITS: 5000 INJECTION, SOLUTION INTRAVENOUS; SUBCUTANEOUS at 21:59

## 2025-08-25 RX ADMIN — ATORVASTATIN CALCIUM 20 MG: 20 TABLET, FILM COATED ORAL at 20:13

## 2025-08-25 RX ADMIN — HEPARIN SODIUM 5000 UNITS: 5000 INJECTION, SOLUTION INTRAVENOUS; SUBCUTANEOUS at 13:00

## 2025-08-25 RX ADMIN — CALCIUM CARBONATE 500 MG: 500 TABLET, CHEWABLE ORAL at 17:02

## 2025-08-25 RX ADMIN — ZOLPIDEM TARTRATE 5 MG: 5 TABLET ORAL at 04:39

## 2025-08-25 RX ADMIN — SODIUM CHLORIDE, SODIUM LACTATE, POTASSIUM CHLORIDE, AND CALCIUM CHLORIDE: 600; 310; 30; 20 INJECTION, SOLUTION INTRAVENOUS at 10:46

## 2025-08-25 RX ADMIN — CLOPIDOGREL BISULFATE 75 MG: 75 TABLET, FILM COATED ORAL at 08:40

## 2025-08-25 RX ADMIN — VANCOMYCIN HYDROCHLORIDE 1750 MG: 10 INJECTION, POWDER, LYOPHILIZED, FOR SOLUTION INTRAVENOUS at 11:27

## 2025-08-25 RX ADMIN — TRAZODONE HYDROCHLORIDE 50 MG: 50 TABLET ORAL at 20:13

## 2025-08-25 ASSESSMENT — PAIN DESCRIPTION - LOCATION: LOCATION: HEAD

## 2025-08-25 ASSESSMENT — PAIN SCALES - GENERAL
PAINLEVEL_OUTOF10: 10
PAINLEVEL_OUTOF10: 0

## 2025-08-25 ASSESSMENT — PAIN DESCRIPTION - ORIENTATION: ORIENTATION: ANTERIOR

## 2025-08-25 ASSESSMENT — PAIN - FUNCTIONAL ASSESSMENT
PAIN_FUNCTIONAL_ASSESSMENT: 0-10
PAIN_FUNCTIONAL_ASSESSMENT: 0-10

## 2025-08-25 ASSESSMENT — PAIN DESCRIPTION - DESCRIPTORS: DESCRIPTORS: ACHING

## 2025-08-26 ENCOUNTER — APPOINTMENT (OUTPATIENT)
Facility: HOSPITAL | Age: 70
End: 2025-08-26
Attending: INTERNAL MEDICINE
Payer: MEDICARE

## 2025-08-26 LAB
ALBUMIN SERPL-MCNC: 2.5 G/DL (ref 3.4–5)
ALBUMIN/GLOB SERPL: 0.7 (ref 0.8–1.7)
ALP SERPL-CCNC: 106 U/L (ref 45–117)
ALT SERPL-CCNC: <5 U/L (ref 10–35)
ANION GAP SERPL CALC-SCNC: 15 MMOL/L (ref 3–18)
AST SERPL-CCNC: 10 U/L (ref 10–38)
BACTERIA SPEC CULT: ABNORMAL
BASOPHILS # BLD: 0.02 K/UL (ref 0–0.1)
BASOPHILS NFR BLD: 0.2 % (ref 0–2)
BILIRUB SERPL-MCNC: 0.2 MG/DL (ref 0.2–1)
BUN SERPL-MCNC: 17 MG/DL (ref 6–23)
BUN/CREAT SERPL: 17 (ref 12–20)
CALCIUM SERPL-MCNC: 9.1 MG/DL (ref 8.5–10.1)
CHLORIDE SERPL-SCNC: 104 MMOL/L (ref 98–107)
CO2 SERPL-SCNC: 18 MMOL/L (ref 21–32)
CREAT SERPL-MCNC: 0.95 MG/DL (ref 0.6–1.3)
DIFFERENTIAL METHOD BLD: ABNORMAL
ECHO AO ROOT DIAM: 2.8 CM
ECHO AO ROOT INDEX: 1.48 CM/M2
ECHO AV AREA PEAK VELOCITY: 2.6 CM2
ECHO AV AREA VTI: 2.6 CM2
ECHO AV AREA/BSA PEAK VELOCITY: 1.4 CM2/M2
ECHO AV AREA/BSA VTI: 1.4 CM2/M2
ECHO AV MEAN GRADIENT: 5 MMHG
ECHO AV MEAN VELOCITY: 1 M/S
ECHO AV PEAK GRADIENT: 9 MMHG
ECHO AV PEAK VELOCITY: 1.5 M/S
ECHO AV VELOCITY RATIO: 0.73
ECHO AV VTI: 27.1 CM
ECHO BSA: 1.92 M2
ECHO LA DIAMETER INDEX: 1.27 CM/M2
ECHO LA DIAMETER: 2.4 CM
ECHO LA TO AORTIC ROOT RATIO: 0.86
ECHO LA VOL A-L A2C: 22 ML (ref 22–52)
ECHO LA VOL A-L A4C: 20 ML (ref 22–52)
ECHO LA VOL BP: 22 ML (ref 22–52)
ECHO LA VOL MOD A2C: 21 ML (ref 22–52)
ECHO LA VOL MOD A4C: 19 ML (ref 22–52)
ECHO LA VOL/BSA BIPLANE: 12 ML/M2 (ref 16–34)
ECHO LA VOLUME AREA LENGTH: 24 ML
ECHO LA VOLUME INDEX A-L A2C: 12 ML/M2 (ref 16–34)
ECHO LA VOLUME INDEX A-L A4C: 11 ML/M2 (ref 16–34)
ECHO LA VOLUME INDEX AREA LENGTH: 13 ML/M2 (ref 16–34)
ECHO LA VOLUME INDEX MOD A2C: 11 ML/M2 (ref 16–34)
ECHO LA VOLUME INDEX MOD A4C: 10 ML/M2 (ref 16–34)
ECHO LV E' LATERAL VELOCITY: 4.12 CM/S
ECHO LV E' SEPTAL VELOCITY: 3.76 CM/S
ECHO LV EDV A2C: 59 ML
ECHO LV EDV A4C: 76 ML
ECHO LV EDV BP: 71 ML (ref 56–104)
ECHO LV EDV INDEX A4C: 40 ML/M2
ECHO LV EDV INDEX BP: 38 ML/M2
ECHO LV EDV NDEX A2C: 31 ML/M2
ECHO LV EF PHYSICIAN: 60 %
ECHO LV EJECTION FRACTION A2C: 55 %
ECHO LV EJECTION FRACTION A4C: 67 %
ECHO LV EJECTION FRACTION BIPLANE: 61 % (ref 55–100)
ECHO LV ESV A2C: 26 ML
ECHO LV ESV A4C: 25 ML
ECHO LV ESV BP: 27 ML (ref 19–49)
ECHO LV ESV INDEX A2C: 14 ML/M2
ECHO LV ESV INDEX A4C: 13 ML/M2
ECHO LV ESV INDEX BP: 14 ML/M2
ECHO LV FRACTIONAL SHORTENING: 32 % (ref 28–44)
ECHO LV INTERNAL DIMENSION DIASTOLE INDEX: 2.17 CM/M2
ECHO LV INTERNAL DIMENSION DIASTOLIC: 4.1 CM (ref 3.9–5.3)
ECHO LV INTERNAL DIMENSION SYSTOLIC INDEX: 1.48 CM/M2
ECHO LV INTERNAL DIMENSION SYSTOLIC: 2.8 CM
ECHO LV IVSD: 1.1 CM (ref 0.6–0.9)
ECHO LV MASS 2D: 151.3 G (ref 67–162)
ECHO LV MASS INDEX 2D: 80.1 G/M2 (ref 43–95)
ECHO LV POSTERIOR WALL DIASTOLIC: 1.1 CM (ref 0.6–0.9)
ECHO LV RELATIVE WALL THICKNESS RATIO: 0.54
ECHO LVOT AREA: 3.5 CM2
ECHO LVOT AV VTI INDEX: 0.75
ECHO LVOT DIAM: 2.1 CM
ECHO LVOT MEAN GRADIENT: 3 MMHG
ECHO LVOT PEAK GRADIENT: 5 MMHG
ECHO LVOT PEAK VELOCITY: 1.1 M/S
ECHO LVOT STROKE VOLUME INDEX: 37 ML/M2
ECHO LVOT SV: 69.9 ML
ECHO LVOT VTI: 20.2 CM
ECHO MV A VELOCITY: 1.33 M/S
ECHO MV E DECELERATION TIME (DT): 133.2 MS
ECHO MV E VELOCITY: 0.87 M/S
ECHO MV E/A RATIO: 0.65
ECHO MV E/E' LATERAL: 21.12
ECHO MV E/E' RATIO (AVERAGED): 22.13
ECHO MV E/E' SEPTAL: 23.14
ECHO RA END SYSTOLIC VOLUME APICAL 4 CHAMBER INDEX BSA: 10 ML/M2
ECHO RA VOLUME: 19 ML
ECHO RV FREE WALL PEAK S': 16.6 CM/S
ECHO RV INTERNAL DIMENSION: 2.6 CM
ECHO RV TAPSE: 2 CM (ref 1.7–?)
EOSINOPHIL # BLD: 0.1 K/UL (ref 0–0.4)
EOSINOPHIL NFR BLD: 1.2 % (ref 0–5)
ERYTHROCYTE [DISTWIDTH] IN BLOOD BY AUTOMATED COUNT: 14.9 % (ref 11.6–14.5)
GLOBULIN SER CALC-MCNC: 3.5 G/DL (ref 2–4)
GLUCOSE SERPL-MCNC: 102 MG/DL (ref 74–108)
GRAM STN SPEC: ABNORMAL
HCT VFR BLD AUTO: 31.7 % (ref 35–45)
HGB BLD-MCNC: 10.2 G/DL (ref 12–16)
IMM GRANULOCYTES # BLD AUTO: 0.03 K/UL (ref 0–0.04)
IMM GRANULOCYTES NFR BLD AUTO: 0.4 % (ref 0–0.5)
LYMPHOCYTES # BLD: 2.69 K/UL (ref 0.9–3.3)
LYMPHOCYTES NFR BLD: 33 % (ref 21–52)
MAGNESIUM SERPL-MCNC: 1.8 MG/DL (ref 1.6–2.6)
MCH RBC QN AUTO: 27.1 PG (ref 24–34)
MCHC RBC AUTO-ENTMCNC: 32.2 G/DL (ref 31–37)
MCV RBC AUTO: 84.1 FL (ref 78–100)
MONOCYTES # BLD: 0.74 K/UL (ref 0.05–1.2)
MONOCYTES NFR BLD: 9.1 % (ref 3–10)
NEUTS SEG # BLD: 4.58 K/UL (ref 1.8–8)
NEUTS SEG NFR BLD: 56.1 % (ref 40–73)
NRBC # BLD: 0 K/UL (ref 0–0.01)
NRBC BLD-RTO: 0 PER 100 WBC
PLATELET # BLD AUTO: 248 K/UL (ref 135–420)
PMV BLD AUTO: 9.2 FL (ref 9.2–11.8)
POTASSIUM SERPL-SCNC: 3.6 MMOL/L (ref 3.5–5.5)
PROT SERPL-MCNC: 6 G/DL (ref 6.4–8.2)
RBC # BLD AUTO: 3.77 M/UL (ref 4.2–5.3)
SERVICE CMNT-IMP: ABNORMAL
SERVICE CMNT-IMP: ABNORMAL
SODIUM SERPL-SCNC: 137 MMOL/L (ref 136–145)
WBC # BLD AUTO: 8.2 K/UL (ref 4.6–13.2)

## 2025-08-26 PROCEDURE — 1100000003 HC PRIVATE W/ TELEMETRY

## 2025-08-26 PROCEDURE — 85025 COMPLETE CBC W/AUTO DIFF WBC: CPT

## 2025-08-26 PROCEDURE — 6360000002 HC RX W HCPCS: Performed by: INTERNAL MEDICINE

## 2025-08-26 PROCEDURE — 87040 BLOOD CULTURE FOR BACTERIA: CPT

## 2025-08-26 PROCEDURE — 6360000002 HC RX W HCPCS: Performed by: HOSPITALIST

## 2025-08-26 PROCEDURE — 51702 INSERT TEMP BLADDER CATH: CPT

## 2025-08-26 PROCEDURE — 2580000003 HC RX 258: Performed by: INTERNAL MEDICINE

## 2025-08-26 PROCEDURE — 36415 COLL VENOUS BLD VENIPUNCTURE: CPT

## 2025-08-26 PROCEDURE — 93308 TTE F-UP OR LMTD: CPT

## 2025-08-26 PROCEDURE — 6370000000 HC RX 637 (ALT 250 FOR IP): Performed by: HOSPITALIST

## 2025-08-26 PROCEDURE — 2500000003 HC RX 250 WO HCPCS: Performed by: INTERNAL MEDICINE

## 2025-08-26 PROCEDURE — 83735 ASSAY OF MAGNESIUM: CPT

## 2025-08-26 PROCEDURE — 2580000003 HC RX 258: Performed by: HOSPITALIST

## 2025-08-26 PROCEDURE — 80053 COMPREHEN METABOLIC PANEL: CPT

## 2025-08-26 RX ORDER — HYDRALAZINE HYDROCHLORIDE 20 MG/ML
10 INJECTION INTRAMUSCULAR; INTRAVENOUS EVERY 6 HOURS PRN
Status: DISCONTINUED | OUTPATIENT
Start: 2025-08-26 | End: 2025-08-29 | Stop reason: HOSPADM

## 2025-08-26 RX ADMIN — WATER 1000 MG: 1 INJECTION INTRAMUSCULAR; INTRAVENOUS; SUBCUTANEOUS at 23:52

## 2025-08-26 RX ADMIN — DULOXETINE 60 MG: 60 CAPSULE, DELAYED RELEASE ORAL at 08:23

## 2025-08-26 RX ADMIN — HEPARIN SODIUM 5000 UNITS: 5000 INJECTION, SOLUTION INTRAVENOUS; SUBCUTANEOUS at 14:15

## 2025-08-26 RX ADMIN — VANCOMYCIN HYDROCHLORIDE 1500 MG: 10 INJECTION, POWDER, LYOPHILIZED, FOR SOLUTION INTRAVENOUS at 12:16

## 2025-08-26 RX ADMIN — ATORVASTATIN CALCIUM 20 MG: 20 TABLET, FILM COATED ORAL at 23:52

## 2025-08-26 RX ADMIN — HYDRALAZINE HYDROCHLORIDE 10 MG: 20 INJECTION INTRAMUSCULAR; INTRAVENOUS at 05:58

## 2025-08-26 RX ADMIN — ACETAMINOPHEN 650 MG: 325 TABLET ORAL at 23:56

## 2025-08-26 RX ADMIN — ACETAMINOPHEN 650 MG: 325 TABLET ORAL at 12:42

## 2025-08-26 RX ADMIN — HEPARIN SODIUM 5000 UNITS: 5000 INJECTION, SOLUTION INTRAVENOUS; SUBCUTANEOUS at 05:58

## 2025-08-26 RX ADMIN — SODIUM CHLORIDE, SODIUM LACTATE, POTASSIUM CHLORIDE, AND CALCIUM CHLORIDE: 600; 310; 30; 20 INJECTION, SOLUTION INTRAVENOUS at 04:40

## 2025-08-26 RX ADMIN — AMITRIPTYLINE HYDROCHLORIDE 50 MG: 50 TABLET ORAL at 23:52

## 2025-08-26 RX ADMIN — CARBIDOPA AND LEVODOPA 1 TABLET: 25; 100 TABLET ORAL at 23:52

## 2025-08-26 RX ADMIN — SODIUM CHLORIDE, SODIUM LACTATE, POTASSIUM CHLORIDE, AND CALCIUM CHLORIDE: 600; 310; 30; 20 INJECTION, SOLUTION INTRAVENOUS at 15:15

## 2025-08-26 RX ADMIN — CARBIDOPA AND LEVODOPA 1 TABLET: 25; 100 TABLET ORAL at 08:23

## 2025-08-26 RX ADMIN — CLOPIDOGREL BISULFATE 75 MG: 75 TABLET, FILM COATED ORAL at 08:23

## 2025-08-26 RX ADMIN — HYDRALAZINE HYDROCHLORIDE 10 MG: 20 INJECTION INTRAMUSCULAR; INTRAVENOUS at 00:34

## 2025-08-26 RX ADMIN — HEPARIN SODIUM 5000 UNITS: 5000 INJECTION, SOLUTION INTRAVENOUS; SUBCUTANEOUS at 23:52

## 2025-08-26 RX ADMIN — TRAZODONE HYDROCHLORIDE 50 MG: 50 TABLET ORAL at 23:56

## 2025-08-26 ASSESSMENT — PAIN SCALES - GENERAL
PAINLEVEL_OUTOF10: 0
PAINLEVEL_OUTOF10: 0
PAINLEVEL_OUTOF10: 9
PAINLEVEL_OUTOF10: 10
PAINLEVEL_OUTOF10: 0

## 2025-08-26 ASSESSMENT — PAIN - FUNCTIONAL ASSESSMENT
PAIN_FUNCTIONAL_ASSESSMENT: 0-10
PAIN_FUNCTIONAL_ASSESSMENT: ACTIVITIES ARE NOT PREVENTED
PAIN_FUNCTIONAL_ASSESSMENT: 0-10

## 2025-08-26 ASSESSMENT — PAIN DESCRIPTION - LOCATION
LOCATION: HEAD
LOCATION: HEAD

## 2025-08-26 ASSESSMENT — PAIN DESCRIPTION - ORIENTATION: ORIENTATION: MID

## 2025-08-26 ASSESSMENT — PAIN DESCRIPTION - DESCRIPTORS: DESCRIPTORS: ACHING

## 2025-08-27 ENCOUNTER — APPOINTMENT (OUTPATIENT)
Facility: HOSPITAL | Age: 70
End: 2025-08-27
Payer: MEDICARE

## 2025-08-27 ENCOUNTER — ANESTHESIA (OUTPATIENT)
Facility: HOSPITAL | Age: 70
End: 2025-08-27
Payer: MEDICARE

## 2025-08-27 LAB
ALBUMIN SERPL-MCNC: 2.6 G/DL (ref 3.4–5)
ALBUMIN/GLOB SERPL: 0.9 (ref 0.8–1.7)
ALP SERPL-CCNC: 109 U/L (ref 45–117)
ALT SERPL-CCNC: <5 U/L (ref 10–35)
ANION GAP SERPL CALC-SCNC: 13 MMOL/L (ref 3–18)
AST SERPL-CCNC: 17 U/L (ref 10–38)
BASOPHILS # BLD: 0.02 K/UL (ref 0–0.1)
BASOPHILS NFR BLD: 0.3 % (ref 0–2)
BILIRUB SERPL-MCNC: 0.3 MG/DL (ref 0.2–1)
BUN SERPL-MCNC: 15 MG/DL (ref 6–23)
BUN/CREAT SERPL: 18 (ref 12–20)
CALCIUM SERPL-MCNC: 8.8 MG/DL (ref 8.5–10.1)
CHLORIDE SERPL-SCNC: 105 MMOL/L (ref 98–107)
CO2 SERPL-SCNC: 20 MMOL/L (ref 21–32)
CREAT SERPL-MCNC: 0.86 MG/DL (ref 0.6–1.3)
DIFFERENTIAL METHOD BLD: ABNORMAL
EOSINOPHIL # BLD: 0.11 K/UL (ref 0–0.4)
EOSINOPHIL NFR BLD: 1.5 % (ref 0–5)
ERYTHROCYTE [DISTWIDTH] IN BLOOD BY AUTOMATED COUNT: 15.1 % (ref 11.6–14.5)
GLOBULIN SER CALC-MCNC: 2.9 G/DL (ref 2–4)
GLUCOSE SERPL-MCNC: 94 MG/DL (ref 74–108)
HCT VFR BLD AUTO: 31.1 % (ref 35–45)
HGB BLD-MCNC: 9.7 G/DL (ref 12–16)
IMM GRANULOCYTES # BLD AUTO: 0.03 K/UL (ref 0–0.04)
IMM GRANULOCYTES NFR BLD AUTO: 0.4 % (ref 0–0.5)
LYMPHOCYTES # BLD: 2.04 K/UL (ref 0.9–3.3)
LYMPHOCYTES NFR BLD: 28.2 % (ref 21–52)
MCH RBC QN AUTO: 27.2 PG (ref 24–34)
MCHC RBC AUTO-ENTMCNC: 31.2 G/DL (ref 31–37)
MCV RBC AUTO: 87.1 FL (ref 78–100)
MONOCYTES # BLD: 0.72 K/UL (ref 0.05–1.2)
MONOCYTES NFR BLD: 10 % (ref 3–10)
NEUTS SEG # BLD: 4.31 K/UL (ref 1.8–8)
NEUTS SEG NFR BLD: 59.6 % (ref 40–73)
NRBC # BLD: 0 K/UL (ref 0–0.01)
NRBC BLD-RTO: 0 PER 100 WBC
PLATELET # BLD AUTO: 242 K/UL (ref 135–420)
PMV BLD AUTO: 9.2 FL (ref 9.2–11.8)
POTASSIUM SERPL-SCNC: 3.8 MMOL/L (ref 3.5–5.5)
PROT SERPL-MCNC: 5.5 G/DL (ref 6.4–8.2)
RBC # BLD AUTO: 3.57 M/UL (ref 4.2–5.3)
SODIUM SERPL-SCNC: 138 MMOL/L (ref 136–145)
VANCOMYCIN SERPL-MCNC: 13.3 UG/ML (ref 5–40)
WBC # BLD AUTO: 7.2 K/UL (ref 4.6–13.2)

## 2025-08-27 PROCEDURE — 6360000002 HC RX W HCPCS: Performed by: NURSE ANESTHETIST, CERTIFIED REGISTERED

## 2025-08-27 PROCEDURE — 6360000002 HC RX W HCPCS: Performed by: ANESTHESIOLOGY

## 2025-08-27 PROCEDURE — C1747 HC ENDOSCOPE, SINGLE, URINARY TRACT: HCPCS | Performed by: UROLOGY

## 2025-08-27 PROCEDURE — C2617 STENT, NON-COR, TEM W/O DEL: HCPCS | Performed by: UROLOGY

## 2025-08-27 PROCEDURE — 2500000003 HC RX 250 WO HCPCS: Performed by: NURSE ANESTHETIST, CERTIFIED REGISTERED

## 2025-08-27 PROCEDURE — 2580000003 HC RX 258: Performed by: INTERNAL MEDICINE

## 2025-08-27 PROCEDURE — 0TP98DZ REMOVAL OF INTRALUMINAL DEVICE FROM URETER, VIA NATURAL OR ARTIFICIAL OPENING ENDOSCOPIC: ICD-10-PCS | Performed by: UROLOGY

## 2025-08-27 PROCEDURE — 3600000002 HC SURGERY LEVEL 2 BASE: Performed by: UROLOGY

## 2025-08-27 PROCEDURE — 2580000003 HC RX 258: Performed by: HOSPITALIST

## 2025-08-27 PROCEDURE — 2709999900 HC NON-CHARGEABLE SUPPLY: Performed by: UROLOGY

## 2025-08-27 PROCEDURE — 82360 CALCULUS ASSAY QUANT: CPT

## 2025-08-27 PROCEDURE — 6360000004 HC RX CONTRAST MEDICATION: Performed by: UROLOGY

## 2025-08-27 PROCEDURE — 3700000001 HC ADD 15 MINUTES (ANESTHESIA): Performed by: UROLOGY

## 2025-08-27 PROCEDURE — 1100000003 HC PRIVATE W/ TELEMETRY

## 2025-08-27 PROCEDURE — 80202 ASSAY OF VANCOMYCIN: CPT

## 2025-08-27 PROCEDURE — 7100000000 HC PACU RECOVERY - FIRST 15 MIN: Performed by: UROLOGY

## 2025-08-27 PROCEDURE — 3600000012 HC SURGERY LEVEL 2 ADDTL 15MIN: Performed by: UROLOGY

## 2025-08-27 PROCEDURE — C1769 GUIDE WIRE: HCPCS | Performed by: UROLOGY

## 2025-08-27 PROCEDURE — 74420 UROGRAPHY RTRGR +-KUB: CPT

## 2025-08-27 PROCEDURE — 2500000003 HC RX 250 WO HCPCS: Performed by: INTERNAL MEDICINE

## 2025-08-27 PROCEDURE — 2720000010 HC SURG SUPPLY STERILE: Performed by: UROLOGY

## 2025-08-27 PROCEDURE — 80053 COMPREHEN METABOLIC PANEL: CPT

## 2025-08-27 PROCEDURE — 7100000001 HC PACU RECOVERY - ADDTL 15 MIN: Performed by: UROLOGY

## 2025-08-27 PROCEDURE — C1758 CATHETER, URETERAL: HCPCS | Performed by: UROLOGY

## 2025-08-27 PROCEDURE — 3700000000 HC ANESTHESIA ATTENDED CARE: Performed by: UROLOGY

## 2025-08-27 PROCEDURE — 6360000002 HC RX W HCPCS: Performed by: INTERNAL MEDICINE

## 2025-08-27 PROCEDURE — 85025 COMPLETE CBC W/AUTO DIFF WBC: CPT

## 2025-08-27 PROCEDURE — 6370000000 HC RX 637 (ALT 250 FOR IP): Performed by: HOSPITALIST

## 2025-08-27 PROCEDURE — 36415 COLL VENOUS BLD VENIPUNCTURE: CPT

## 2025-08-27 DEVICE — STENT URET 4.8FR L22-30CM PERCFLX HYDR+ SFT PGTL TAPR TIP: Type: IMPLANTABLE DEVICE | Site: URETER | Status: FUNCTIONAL

## 2025-08-27 RX ORDER — SODIUM CHLORIDE, SODIUM LACTATE, POTASSIUM CHLORIDE, CALCIUM CHLORIDE 600; 310; 30; 20 MG/100ML; MG/100ML; MG/100ML; MG/100ML
INJECTION, SOLUTION INTRAVENOUS CONTINUOUS
Status: DISCONTINUED | OUTPATIENT
Start: 2025-08-27 | End: 2025-08-29

## 2025-08-27 RX ORDER — PHENYLEPHRINE HCL IN 0.9% NACL 1 MG/10 ML
SYRINGE (ML) INTRAVENOUS
Status: DISCONTINUED | OUTPATIENT
Start: 2025-08-27 | End: 2025-08-27 | Stop reason: SDUPTHER

## 2025-08-27 RX ORDER — ENOXAPARIN SODIUM 100 MG/ML
40 INJECTION SUBCUTANEOUS DAILY
Status: CANCELLED | OUTPATIENT
Start: 2025-08-27

## 2025-08-27 RX ORDER — LIDOCAINE HYDROCHLORIDE 20 MG/ML
INJECTION, SOLUTION EPIDURAL; INFILTRATION; INTRACAUDAL; PERINEURAL
Status: DISCONTINUED | OUTPATIENT
Start: 2025-08-27 | End: 2025-08-27 | Stop reason: SDUPTHER

## 2025-08-27 RX ORDER — FENTANYL CITRATE 50 UG/ML
50 INJECTION, SOLUTION INTRAMUSCULAR; INTRAVENOUS EVERY 5 MIN PRN
Status: COMPLETED | OUTPATIENT
Start: 2025-08-27 | End: 2025-08-27

## 2025-08-27 RX ORDER — PROPOFOL 10 MG/ML
INJECTION, EMULSION INTRAVENOUS
Status: DISCONTINUED | OUTPATIENT
Start: 2025-08-27 | End: 2025-08-27 | Stop reason: SDUPTHER

## 2025-08-27 RX ORDER — ONDANSETRON 2 MG/ML
INJECTION INTRAMUSCULAR; INTRAVENOUS
Status: DISCONTINUED | OUTPATIENT
Start: 2025-08-27 | End: 2025-08-27 | Stop reason: SDUPTHER

## 2025-08-27 RX ORDER — FENTANYL CITRATE 50 UG/ML
INJECTION, SOLUTION INTRAMUSCULAR; INTRAVENOUS
Status: DISCONTINUED | OUTPATIENT
Start: 2025-08-27 | End: 2025-08-27 | Stop reason: SDUPTHER

## 2025-08-27 RX ORDER — HYDROMORPHONE HYDROCHLORIDE 1 MG/ML
0.5 INJECTION, SOLUTION INTRAMUSCULAR; INTRAVENOUS; SUBCUTANEOUS EVERY 5 MIN PRN
Status: DISCONTINUED | OUTPATIENT
Start: 2025-08-27 | End: 2025-08-27 | Stop reason: HOSPADM

## 2025-08-27 RX ORDER — EPHEDRINE SULFATE 5 MG/ML
INJECTION INTRAVENOUS
Status: DISCONTINUED | OUTPATIENT
Start: 2025-08-27 | End: 2025-08-27 | Stop reason: SDUPTHER

## 2025-08-27 RX ORDER — IOPAMIDOL 612 MG/ML
INJECTION, SOLUTION INTRATHECAL PRN
Status: DISCONTINUED | OUTPATIENT
Start: 2025-08-27 | End: 2025-08-27 | Stop reason: ALTCHOICE

## 2025-08-27 RX ORDER — DEXAMETHASONE SODIUM PHOSPHATE 4 MG/ML
INJECTION, SOLUTION INTRA-ARTICULAR; INTRALESIONAL; INTRAMUSCULAR; INTRAVENOUS; SOFT TISSUE
Status: DISCONTINUED | OUTPATIENT
Start: 2025-08-27 | End: 2025-08-27 | Stop reason: SDUPTHER

## 2025-08-27 RX ORDER — ONDANSETRON 2 MG/ML
4 INJECTION INTRAMUSCULAR; INTRAVENOUS
Status: COMPLETED | OUTPATIENT
Start: 2025-08-27 | End: 2025-08-27

## 2025-08-27 RX ADMIN — Medication 100 MCG: at 14:51

## 2025-08-27 RX ADMIN — VANCOMYCIN HYDROCHLORIDE 1500 MG: 10 INJECTION, POWDER, LYOPHILIZED, FOR SOLUTION INTRAVENOUS at 13:28

## 2025-08-27 RX ADMIN — LIDOCAINE HYDROCHLORIDE 80 MG: 20 INJECTION, SOLUTION EPIDURAL; INFILTRATION; INTRACAUDAL; PERINEURAL at 14:12

## 2025-08-27 RX ADMIN — SODIUM CHLORIDE, SODIUM LACTATE, POTASSIUM CHLORIDE, AND CALCIUM CHLORIDE: 600; 310; 30; 20 INJECTION, SOLUTION INTRAVENOUS at 13:51

## 2025-08-27 RX ADMIN — EPHEDRINE SULFATE 5 MG: 5 INJECTION INTRAVENOUS at 15:37

## 2025-08-27 RX ADMIN — CARBIDOPA AND LEVODOPA 1 TABLET: 25; 100 TABLET ORAL at 21:23

## 2025-08-27 RX ADMIN — FENTANYL CITRATE 50 MCG: 50 INJECTION INTRAMUSCULAR; INTRAVENOUS at 16:53

## 2025-08-27 RX ADMIN — Medication 100 MCG: at 15:28

## 2025-08-27 RX ADMIN — DEXAMETHASONE SODIUM PHOSPHATE 4 MG: 4 INJECTION INTRA-ARTICULAR; INTRALESIONAL; INTRAMUSCULAR; INTRAVENOUS; SOFT TISSUE at 14:12

## 2025-08-27 RX ADMIN — EPHEDRINE SULFATE 5 MG: 5 INJECTION INTRAVENOUS at 14:36

## 2025-08-27 RX ADMIN — Medication 100 MCG: at 14:37

## 2025-08-27 RX ADMIN — FENTANYL CITRATE 50 MCG: 50 INJECTION INTRAMUSCULAR; INTRAVENOUS at 14:22

## 2025-08-27 RX ADMIN — PROPOFOL 150 MG: 10 INJECTION, EMULSION INTRAVENOUS at 14:12

## 2025-08-27 RX ADMIN — FENTANYL CITRATE 50 MCG: 50 INJECTION INTRAMUSCULAR; INTRAVENOUS at 16:32

## 2025-08-27 RX ADMIN — ONDANSETRON 4 MG: 2 INJECTION, SOLUTION INTRAMUSCULAR; INTRAVENOUS at 16:01

## 2025-08-27 RX ADMIN — AMITRIPTYLINE HYDROCHLORIDE 50 MG: 50 TABLET ORAL at 21:23

## 2025-08-27 RX ADMIN — ONDANSETRON 4 MG: 2 INJECTION, SOLUTION INTRAMUSCULAR; INTRAVENOUS at 16:58

## 2025-08-27 RX ADMIN — EPHEDRINE SULFATE 5 MG: 5 INJECTION INTRAVENOUS at 14:33

## 2025-08-27 RX ADMIN — FENTANYL CITRATE 50 MCG: 50 INJECTION INTRAMUSCULAR; INTRAVENOUS at 14:12

## 2025-08-27 RX ADMIN — Medication 100 MCG: at 15:09

## 2025-08-27 RX ADMIN — Medication 100 MCG: at 15:02

## 2025-08-27 RX ADMIN — WATER 1000 MG: 1 INJECTION INTRAMUSCULAR; INTRAVENOUS; SUBCUTANEOUS at 20:31

## 2025-08-27 RX ADMIN — TRAZODONE HYDROCHLORIDE 50 MG: 50 TABLET ORAL at 21:23

## 2025-08-27 RX ADMIN — Medication 100 MCG: at 15:20

## 2025-08-27 RX ADMIN — EPHEDRINE SULFATE 5 MG: 5 INJECTION INTRAVENOUS at 15:35

## 2025-08-27 RX ADMIN — ATORVASTATIN CALCIUM 20 MG: 20 TABLET, FILM COATED ORAL at 21:23

## 2025-08-27 ASSESSMENT — PAIN DESCRIPTION - PAIN TYPE
TYPE: SURGICAL PAIN

## 2025-08-27 ASSESSMENT — PAIN DESCRIPTION - LOCATION
LOCATION: PERINEUM

## 2025-08-27 ASSESSMENT — PAIN - FUNCTIONAL ASSESSMENT
PAIN_FUNCTIONAL_ASSESSMENT: ACTIVITIES ARE NOT PREVENTED
PAIN_FUNCTIONAL_ASSESSMENT: 0-10
PAIN_FUNCTIONAL_ASSESSMENT: ACTIVITIES ARE NOT PREVENTED
PAIN_FUNCTIONAL_ASSESSMENT: 0-10
PAIN_FUNCTIONAL_ASSESSMENT: ACTIVITIES ARE NOT PREVENTED
PAIN_FUNCTIONAL_ASSESSMENT: ACTIVITIES ARE NOT PREVENTED
PAIN_FUNCTIONAL_ASSESSMENT: 0-10
PAIN_FUNCTIONAL_ASSESSMENT: ACTIVITIES ARE NOT PREVENTED
PAIN_FUNCTIONAL_ASSESSMENT: 0-10
PAIN_FUNCTIONAL_ASSESSMENT: ACTIVITIES ARE NOT PREVENTED

## 2025-08-27 ASSESSMENT — PAIN SCALES - GENERAL
PAINLEVEL_OUTOF10: 0
PAINLEVEL_OUTOF10: 3
PAINLEVEL_OUTOF10: 0
PAINLEVEL_OUTOF10: 0
PAINLEVEL_OUTOF10: 3
PAINLEVEL_OUTOF10: 6
PAINLEVEL_OUTOF10: 0
PAINLEVEL_OUTOF10: 0
PAINLEVEL_OUTOF10: 7
PAINLEVEL_OUTOF10: 3
PAINLEVEL_OUTOF10: 0
PAINLEVEL_OUTOF10: 3
PAINLEVEL_OUTOF10: 0
PAINLEVEL_OUTOF10: 0
PAINLEVEL_OUTOF10: 3
PAINLEVEL_OUTOF10: 0
PAINLEVEL_OUTOF10: 5
PAINLEVEL_OUTOF10: 0

## 2025-08-27 ASSESSMENT — PAIN DESCRIPTION - FREQUENCY
FREQUENCY: CONTINUOUS

## 2025-08-27 ASSESSMENT — PAIN DESCRIPTION - ONSET
ONSET: ON-GOING

## 2025-08-27 ASSESSMENT — PAIN DESCRIPTION - DESCRIPTORS
DESCRIPTORS: ACHING

## 2025-08-28 ENCOUNTER — APPOINTMENT (OUTPATIENT)
Facility: HOSPITAL | Age: 70
End: 2025-08-28
Payer: MEDICARE

## 2025-08-28 LAB
ALBUMIN SERPL-MCNC: 2.6 G/DL (ref 3.4–5)
ALBUMIN/GLOB SERPL: 0.8 (ref 0.8–1.7)
ALP SERPL-CCNC: 108 U/L (ref 45–117)
ALT SERPL-CCNC: <5 U/L (ref 10–35)
ANION GAP SERPL CALC-SCNC: 16 MMOL/L (ref 3–18)
AST SERPL-CCNC: 12 U/L (ref 10–38)
BASOPHILS # BLD: 0.02 K/UL (ref 0–0.1)
BASOPHILS NFR BLD: 0.2 % (ref 0–2)
BILIRUB SERPL-MCNC: 0.3 MG/DL (ref 0.2–1)
BUN SERPL-MCNC: 12 MG/DL (ref 6–23)
BUN/CREAT SERPL: 14 (ref 12–20)
CALCIUM SERPL-MCNC: 9.1 MG/DL (ref 8.5–10.1)
CHLORIDE SERPL-SCNC: 104 MMOL/L (ref 98–107)
CO2 SERPL-SCNC: 20 MMOL/L (ref 21–32)
CREAT SERPL-MCNC: 0.9 MG/DL (ref 0.6–1.3)
DIFFERENTIAL METHOD BLD: ABNORMAL
EOSINOPHIL # BLD: 0.11 K/UL (ref 0–0.4)
EOSINOPHIL NFR BLD: 1 % (ref 0–5)
ERYTHROCYTE [DISTWIDTH] IN BLOOD BY AUTOMATED COUNT: 15 % (ref 11.6–14.5)
GLOBULIN SER CALC-MCNC: 3.4 G/DL (ref 2–4)
GLUCOSE SERPL-MCNC: 97 MG/DL (ref 74–108)
HCT VFR BLD AUTO: 32.2 % (ref 35–45)
HGB BLD-MCNC: 10.1 G/DL (ref 12–16)
IMM GRANULOCYTES # BLD AUTO: 0.04 K/UL (ref 0–0.04)
IMM GRANULOCYTES NFR BLD AUTO: 0.4 % (ref 0–0.5)
LYMPHOCYTES # BLD: 2.12 K/UL (ref 0.9–3.3)
LYMPHOCYTES NFR BLD: 18.9 % (ref 21–52)
MCH RBC QN AUTO: 27.2 PG (ref 24–34)
MCHC RBC AUTO-ENTMCNC: 31.4 G/DL (ref 31–37)
MCV RBC AUTO: 86.8 FL (ref 78–100)
MONOCYTES # BLD: 1.1 K/UL (ref 0.05–1.2)
MONOCYTES NFR BLD: 9.8 % (ref 3–10)
NEUTS SEG # BLD: 7.83 K/UL (ref 1.8–8)
NEUTS SEG NFR BLD: 69.7 % (ref 40–73)
NRBC # BLD: 0 K/UL (ref 0–0.01)
NRBC BLD-RTO: 0 PER 100 WBC
PLATELET # BLD AUTO: 228 K/UL (ref 135–420)
PMV BLD AUTO: 9.1 FL (ref 9.2–11.8)
POTASSIUM SERPL-SCNC: 3.7 MMOL/L (ref 3.5–5.5)
PROT SERPL-MCNC: 6 G/DL (ref 6.4–8.2)
RBC # BLD AUTO: 3.71 M/UL (ref 4.2–5.3)
SODIUM SERPL-SCNC: 140 MMOL/L (ref 136–145)
WBC # BLD AUTO: 11.2 K/UL (ref 4.6–13.2)

## 2025-08-28 PROCEDURE — 1100000003 HC PRIVATE W/ TELEMETRY

## 2025-08-28 PROCEDURE — 6360000002 HC RX W HCPCS: Performed by: INTERNAL MEDICINE

## 2025-08-28 PROCEDURE — 97530 THERAPEUTIC ACTIVITIES: CPT

## 2025-08-28 PROCEDURE — 36415 COLL VENOUS BLD VENIPUNCTURE: CPT

## 2025-08-28 PROCEDURE — 85025 COMPLETE CBC W/AUTO DIFF WBC: CPT

## 2025-08-28 PROCEDURE — 6370000000 HC RX 637 (ALT 250 FOR IP): Performed by: HOSPITALIST

## 2025-08-28 PROCEDURE — 2500000003 HC RX 250 WO HCPCS: Performed by: INTERNAL MEDICINE

## 2025-08-28 PROCEDURE — 6370000000 HC RX 637 (ALT 250 FOR IP): Performed by: INTERNAL MEDICINE

## 2025-08-28 PROCEDURE — 2580000003 HC RX 258: Performed by: UROLOGY

## 2025-08-28 PROCEDURE — 80053 COMPREHEN METABOLIC PANEL: CPT

## 2025-08-28 PROCEDURE — 02HV33Z INSERTION OF INFUSION DEVICE INTO SUPERIOR VENA CAVA, PERCUTANEOUS APPROACH: ICD-10-PCS | Performed by: HOSPITALIST

## 2025-08-28 PROCEDURE — 2580000003 HC RX 258: Performed by: INTERNAL MEDICINE

## 2025-08-28 RX ADMIN — ATORVASTATIN CALCIUM 20 MG: 20 TABLET, FILM COATED ORAL at 21:03

## 2025-08-28 RX ADMIN — AMITRIPTYLINE HYDROCHLORIDE 50 MG: 50 TABLET ORAL at 21:03

## 2025-08-28 RX ADMIN — CARBIDOPA AND LEVODOPA 1 TABLET: 25; 100 TABLET ORAL at 08:57

## 2025-08-28 RX ADMIN — BUTALBITAL, ACETAMINOPHEN, AND CAFFEINE 1 TABLET: 50; 325; 40 TABLET ORAL at 16:00

## 2025-08-28 RX ADMIN — SODIUM CHLORIDE, SODIUM LACTATE, POTASSIUM CHLORIDE, AND CALCIUM CHLORIDE: .6; .31; .03; .02 INJECTION, SOLUTION INTRAVENOUS at 11:03

## 2025-08-28 RX ADMIN — ZOLPIDEM TARTRATE 5 MG: 5 TABLET ORAL at 21:03

## 2025-08-28 RX ADMIN — SODIUM CHLORIDE, SODIUM LACTATE, POTASSIUM CHLORIDE, AND CALCIUM CHLORIDE: .6; .31; .03; .02 INJECTION, SOLUTION INTRAVENOUS at 02:41

## 2025-08-28 RX ADMIN — DULOXETINE 60 MG: 60 CAPSULE, DELAYED RELEASE ORAL at 08:57

## 2025-08-28 RX ADMIN — WATER 1000 MG: 1 INJECTION INTRAMUSCULAR; INTRAVENOUS; SUBCUTANEOUS at 21:03

## 2025-08-28 RX ADMIN — TRAZODONE HYDROCHLORIDE 50 MG: 50 TABLET ORAL at 21:03

## 2025-08-28 RX ADMIN — VANCOMYCIN HYDROCHLORIDE 1500 MG: 10 INJECTION, POWDER, LYOPHILIZED, FOR SOLUTION INTRAVENOUS at 15:52

## 2025-08-28 RX ADMIN — CARBIDOPA AND LEVODOPA 1 TABLET: 25; 100 TABLET ORAL at 21:02

## 2025-08-28 RX ADMIN — CLOPIDOGREL BISULFATE 75 MG: 75 TABLET, FILM COATED ORAL at 08:56

## 2025-08-28 RX ADMIN — SODIUM CHLORIDE, SODIUM LACTATE, POTASSIUM CHLORIDE, AND CALCIUM CHLORIDE: .6; .31; .03; .02 INJECTION, SOLUTION INTRAVENOUS at 21:35

## 2025-08-28 RX ADMIN — BUTALBITAL, ACETAMINOPHEN, AND CAFFEINE 1 TABLET: 50; 325; 40 TABLET ORAL at 22:59

## 2025-08-28 ASSESSMENT — PAIN - FUNCTIONAL ASSESSMENT
PAIN_FUNCTIONAL_ASSESSMENT: 0-10
PAIN_FUNCTIONAL_ASSESSMENT: 0-10

## 2025-08-28 ASSESSMENT — PAIN DESCRIPTION - LOCATION
LOCATION: HEAD
LOCATION: HEAD

## 2025-08-28 ASSESSMENT — PAIN SCALES - GENERAL
PAINLEVEL_OUTOF10: 4
PAINLEVEL_OUTOF10: 8
PAINLEVEL_OUTOF10: 0

## 2025-08-29 ENCOUNTER — APPOINTMENT (OUTPATIENT)
Facility: HOSPITAL | Age: 70
End: 2025-08-29
Payer: MEDICARE

## 2025-08-29 VITALS
BODY MASS INDEX: 27.97 KG/M2 | HEIGHT: 66 IN | HEART RATE: 99 BPM | TEMPERATURE: 98.4 F | OXYGEN SATURATION: 100 % | DIASTOLIC BLOOD PRESSURE: 73 MMHG | RESPIRATION RATE: 18 BRPM | SYSTOLIC BLOOD PRESSURE: 148 MMHG | WEIGHT: 174 LBS

## 2025-08-29 LAB
ANION GAP SERPL CALC-SCNC: 14 MMOL/L (ref 3–18)
BACTERIA SPEC CULT: ABNORMAL
BACTERIA SPEC CULT: ABNORMAL
BUN SERPL-MCNC: 10 MG/DL (ref 6–23)
BUN/CREAT SERPL: 12 (ref 12–20)
CALCIUM SERPL-MCNC: 8.6 MG/DL (ref 8.5–10.1)
CC UR VC: ABNORMAL
CHLORIDE SERPL-SCNC: 105 MMOL/L (ref 98–107)
CO2 SERPL-SCNC: 20 MMOL/L (ref 21–32)
CREAT SERPL-MCNC: 0.89 MG/DL (ref 0.6–1.3)
GLUCOSE BLD STRIP.AUTO-MCNC: 103 MG/DL (ref 70–110)
GLUCOSE SERPL-MCNC: 89 MG/DL (ref 74–108)
POTASSIUM SERPL-SCNC: 3.2 MMOL/L (ref 3.5–5.5)
SERVICE CMNT-IMP: ABNORMAL
SODIUM SERPL-SCNC: 139 MMOL/L (ref 136–145)

## 2025-08-29 PROCEDURE — 6360000002 HC RX W HCPCS: Performed by: HOSPITALIST

## 2025-08-29 PROCEDURE — 82962 GLUCOSE BLOOD TEST: CPT

## 2025-08-29 PROCEDURE — C1751 CATH, INF, PER/CENT/MIDLINE: HCPCS

## 2025-08-29 PROCEDURE — 97110 THERAPEUTIC EXERCISES: CPT

## 2025-08-29 PROCEDURE — 6360000002 HC RX W HCPCS: Performed by: INTERNAL MEDICINE

## 2025-08-29 PROCEDURE — 80048 BASIC METABOLIC PNL TOTAL CA: CPT

## 2025-08-29 PROCEDURE — 36415 COLL VENOUS BLD VENIPUNCTURE: CPT

## 2025-08-29 PROCEDURE — 6370000000 HC RX 637 (ALT 250 FOR IP)

## 2025-08-29 PROCEDURE — 97530 THERAPEUTIC ACTIVITIES: CPT

## 2025-08-29 PROCEDURE — 6370000000 HC RX 637 (ALT 250 FOR IP): Performed by: HOSPITALIST

## 2025-08-29 PROCEDURE — 2580000003 HC RX 258: Performed by: INTERNAL MEDICINE

## 2025-08-29 RX ORDER — LIDOCAINE HYDROCHLORIDE 10 MG/ML
20 INJECTION, SOLUTION INFILTRATION; PERINEURAL ONCE
Status: COMPLETED | OUTPATIENT
Start: 2025-08-29 | End: 2025-08-29

## 2025-08-29 RX ORDER — HEPARIN SODIUM 200 [USP'U]/100ML
1000 INJECTION, SOLUTION INTRAVENOUS ONCE
Status: COMPLETED | OUTPATIENT
Start: 2025-08-29 | End: 2025-08-29

## 2025-08-29 RX ORDER — POTASSIUM CHLORIDE 1500 MG/1
40 TABLET, EXTENDED RELEASE ORAL ONCE
Status: COMPLETED | OUTPATIENT
Start: 2025-08-29 | End: 2025-08-29

## 2025-08-29 RX ORDER — HEPARIN SODIUM (PORCINE) LOCK FLUSH IV SOLN 100 UNIT/ML 100 UNIT/ML
500 SOLUTION INTRAVENOUS EVERY 8 HOURS PRN
Status: DISCONTINUED | OUTPATIENT
Start: 2025-08-29 | End: 2025-08-29 | Stop reason: HOSPADM

## 2025-08-29 RX ADMIN — CARBIDOPA AND LEVODOPA 1 TABLET: 25; 100 TABLET ORAL at 10:47

## 2025-08-29 RX ADMIN — CLOPIDOGREL BISULFATE 75 MG: 75 TABLET, FILM COATED ORAL at 10:47

## 2025-08-29 RX ADMIN — HEPARIN SODIUM 1000 UNITS: 200 INJECTION, SOLUTION INTRAVENOUS at 09:03

## 2025-08-29 RX ADMIN — POTASSIUM CHLORIDE 40 MEQ: 1500 TABLET, EXTENDED RELEASE ORAL at 10:47

## 2025-08-29 RX ADMIN — BUTALBITAL, ACETAMINOPHEN, AND CAFFEINE 1 TABLET: 50; 325; 40 TABLET ORAL at 15:51

## 2025-08-29 RX ADMIN — LIDOCAINE HYDROCHLORIDE 3 ML: 10 INJECTION, SOLUTION INFILTRATION; PERINEURAL at 09:01

## 2025-08-29 RX ADMIN — BUTALBITAL, ACETAMINOPHEN, AND CAFFEINE 1 TABLET: 50; 325; 40 TABLET ORAL at 10:48

## 2025-08-29 RX ADMIN — Medication 500 UNITS: at 09:03

## 2025-08-29 RX ADMIN — VANCOMYCIN HYDROCHLORIDE 1500 MG: 10 INJECTION, POWDER, LYOPHILIZED, FOR SOLUTION INTRAVENOUS at 13:49

## 2025-08-29 RX ADMIN — DULOXETINE 60 MG: 60 CAPSULE, DELAYED RELEASE ORAL at 10:47

## 2025-08-29 ASSESSMENT — PAIN DESCRIPTION - ORIENTATION: ORIENTATION: MID

## 2025-08-29 ASSESSMENT — PAIN DESCRIPTION - ONSET: ONSET: ON-GOING

## 2025-08-29 ASSESSMENT — PAIN SCALES - GENERAL
PAINLEVEL_OUTOF10: 10
PAINLEVEL_OUTOF10: 0
PAINLEVEL_OUTOF10: 3

## 2025-08-29 ASSESSMENT — PAIN DESCRIPTION - FREQUENCY: FREQUENCY: INTERMITTENT

## 2025-08-29 ASSESSMENT — PAIN DESCRIPTION - LOCATION
LOCATION: HEAD

## 2025-08-29 ASSESSMENT — PAIN - FUNCTIONAL ASSESSMENT
PAIN_FUNCTIONAL_ASSESSMENT: 0-10
PAIN_FUNCTIONAL_ASSESSMENT: 0-10
PAIN_FUNCTIONAL_ASSESSMENT: ACTIVITIES ARE NOT PREVENTED

## 2025-08-29 ASSESSMENT — PAIN DESCRIPTION - DESCRIPTORS: DESCRIPTORS: ACHING

## 2025-09-01 LAB
BACTERIA SPEC CULT: NORMAL
BACTERIA SPEC CULT: NORMAL
SERVICE CMNT-IMP: NORMAL
SERVICE CMNT-IMP: NORMAL

## (undated) DEVICE — GLOVE ORANGE PI 7 1/2   MSG9075

## (undated) DEVICE — MAT SUCTION FLR 44X36 IN DBL SIDE BLU

## (undated) DEVICE — Device

## (undated) DEVICE — DRAINBAG,ANTI-REFLUX TOWER,L/F,2000ML,LL: Brand: MEDLINE

## (undated) DEVICE — CATHETER F BLLN 5CC 18FR 2 W HYDRGEL COAT LESS TRAUM LUB

## (undated) DEVICE — SOLUTION IRRIG 3000ML 0.9% SOD CHL USP UROMATIC PLAS CONT

## (undated) DEVICE — CATHETER URET 10FR L54CM 2 TORQUEABLE

## (undated) DEVICE — BAG PRESSURE INFUSION 3 W STOPCOCK 3000 CC PREMIERPRO DISP

## (undated) DEVICE — SOL IRR SOD CHL 0.9% TITAN XL CNTNR 3000ML

## (undated) DEVICE — ADAPTER INSTR 9FR UNIV CK FLO

## (undated) DEVICE — CYSTO PACK: Brand: MEDLINE INDUSTRIES, INC.

## (undated) DEVICE — SOLUTION IRRIG 1000ML H2O PIC PLAS SHATTERPROOF CONTAINER

## (undated) DEVICE — URETEROSCOPE FLX 100 DEG FLD VW SHFT L 670 MM WORKING

## (undated) DEVICE — GUIDEWIRE ENDOSCP L150CM DIA0.035IN TIP 3CM PTFE NIT

## (undated) DEVICE — GUIDEWIRE URO L150CM DIA0.038IN STD NIT HYDRPHLC STR TIP

## (undated) DEVICE — GARMENT COMPR M FOR 12-18IN CALF INTMIT SGL BLDR HEMO-FORCE

## (undated) DEVICE — STRAP,POSITIONING,KNEE/BODY,FOAM,4X60": Brand: MEDLINE

## (undated) DEVICE — SOLUTION IRRIG 1500ML STRL H2O AQUALITE PLAS POUR BTL

## (undated) DEVICE — CATHETER URETH 12FR BLLN 5CC LTX HYDRGEL 2 W BARDX LUB F

## (undated) DEVICE — DRAPE C ARM UNIV W41XL74IN CLR PLAS XR VELC CLSR POLY STRP

## (undated) DEVICE — TUBING SUCT L12FT DIA0.25IN CLR W/ MAXI-GRIP AND M/M CONN

## (undated) DEVICE — STRAP POS FOAM SFT STR FOR KNEE BODY

## (undated) DEVICE — SUTURE PROL SZ 0 L30IN NONABSORBABLE BLU CTX L48MM 1/2 CIR 8454H

## (undated) DEVICE — GLOVE SURG SZ 65 THK91MIL LTX FREE SYN POLYISOPRENE

## (undated) DEVICE — POUCH DRNGE FLX BND INTEGR RAIL CLMP DISP EZ CTCH

## (undated) DEVICE — CATHETER URET 5FR L70CM OPN END SGL LUMN INJ HUB FLEXIMA

## (undated) DEVICE — TRAP SPEC 40ML CLR PLAS MUCUS TRNSPRT CUP

## (undated) DEVICE — SPONGE GZ KERLIX W4.5INXL4.1YD COT 6 PLY OPN WV STRETCHABLE

## (undated) DEVICE — SYRINGE MED 10ML LUERLOCK TIP W/O SFTY DISP

## (undated) DEVICE — SYRINGE MED 30ML STD CLR PLAS LUERLOCK TIP N CTRL DISP

## (undated) DEVICE — CATHETER URET L70CM OD6FR OPN END FLEXIMA

## (undated) DEVICE — STONE COLLECTION BOTTLE

## (undated) DEVICE — CATHETER F BLLN 5CC 16FR 2 W HYDRGEL COAT LESS TRAUM LUB

## (undated) DEVICE — TUBING SCTION CONN 1/4X12 RIB